# Patient Record
Sex: MALE | Race: WHITE | NOT HISPANIC OR LATINO | Employment: OTHER | URBAN - NONMETROPOLITAN AREA
[De-identification: names, ages, dates, MRNs, and addresses within clinical notes are randomized per-mention and may not be internally consistent; named-entity substitution may affect disease eponyms.]

---

## 2021-11-10 ENCOUNTER — HOSPITAL ENCOUNTER (EMERGENCY)
Facility: HOSPITAL | Age: 71
Discharge: HOME | End: 2021-11-10
Attending: EMERGENCY MEDICINE | Admitting: EMERGENCY MEDICINE
Payer: MEDICARE

## 2021-11-10 ENCOUNTER — APPOINTMENT (EMERGENCY)
Dept: CT IMAGING | Facility: HOSPITAL | Age: 71
End: 2021-11-10
Payer: MEDICARE

## 2021-11-10 VITALS
OXYGEN SATURATION: 97 % | SYSTOLIC BLOOD PRESSURE: 154 MMHG | RESPIRATION RATE: 18 BRPM | DIASTOLIC BLOOD PRESSURE: 94 MMHG | HEART RATE: 80 BPM | TEMPERATURE: 98.5 F

## 2021-11-10 DIAGNOSIS — N20.1 LEFT URETERAL STONE: Primary | ICD-10-CM

## 2021-11-10 LAB
ALBUMIN SERPL BCP-MCNC: 4.1 G/DL (ref 3.5–5)
ALP SERPL-CCNC: 81 U/L (ref 46–116)
ALT SERPL W P-5'-P-CCNC: 46 U/L (ref 12–78)
ANION GAP SERPL CALCULATED.3IONS-SCNC: 10 MMOL/L (ref 4–13)
AST SERPL W P-5'-P-CCNC: 26 U/L (ref 5–45)
BACTERIA UR QL AUTO: ABNORMAL /HPF
BILIRUB SERPL-MCNC: 1.34 MG/DL (ref 0.2–1)
BILIRUB UR QL STRIP: ABNORMAL
BUN SERPL-MCNC: 27 MG/DL (ref 5–25)
CALCIUM SERPL-MCNC: 9.4 MG/DL (ref 8.3–10.1)
CAOX CRY URNS QL MICRO: ABNORMAL /HPF
CHLORIDE SERPL-SCNC: 103 MMOL/L (ref 100–108)
CLARITY UR: ABNORMAL
CO2 SERPL-SCNC: 27 MMOL/L (ref 21–32)
COLOR UR: YELLOW
CREAT SERPL-MCNC: 1.05 MG/DL (ref 0.6–1.3)
ERYTHROCYTE [DISTWIDTH] IN BLOOD BY AUTOMATED COUNT: 12 % (ref 11.6–15.1)
GFR SERPL CREATININE-BSD FRML MDRD: 72 ML/MIN/1.73SQ M
GLUCOSE SERPL-MCNC: 154 MG/DL (ref 65–140)
GLUCOSE UR STRIP-MCNC: NEGATIVE MG/DL
HCT VFR BLD AUTO: 42.6 % (ref 36.5–49.3)
HGB BLD-MCNC: 15.2 G/DL (ref 12–17)
HGB UR QL STRIP.AUTO: ABNORMAL
HYALINE CASTS #/AREA URNS LPF: ABNORMAL /LPF
KETONES UR STRIP-MCNC: ABNORMAL MG/DL
LACTATE SERPL-SCNC: 1.1 MMOL/L (ref 0.5–2)
LEUKOCYTE ESTERASE UR QL STRIP: NEGATIVE
LIPASE SERPL-CCNC: 147 U/L (ref 73–393)
MCH RBC QN AUTO: 33.3 PG (ref 26.8–34.3)
MCHC RBC AUTO-ENTMCNC: 35.7 G/DL (ref 31.4–37.4)
MCV RBC AUTO: 93 FL (ref 82–98)
NITRITE UR QL STRIP: NEGATIVE
NON-SQ EPI CELLS URNS QL MICRO: ABNORMAL /HPF
PH UR STRIP.AUTO: 5.5 [PH]
PLATELET # BLD AUTO: 188 THOUSANDS/UL (ref 149–390)
PMV BLD AUTO: 10.5 FL (ref 8.9–12.7)
POTASSIUM SERPL-SCNC: 3.8 MMOL/L (ref 3.5–5.3)
PROT SERPL-MCNC: 7.3 G/DL (ref 6.4–8.2)
PROT UR STRIP-MCNC: NEGATIVE MG/DL
RBC # BLD AUTO: 4.56 MILLION/UL (ref 3.88–5.62)
RBC #/AREA URNS AUTO: ABNORMAL /HPF
SODIUM SERPL-SCNC: 140 MMOL/L (ref 136–145)
SP GR UR STRIP.AUTO: 1.02 (ref 1–1.03)
UROBILINOGEN UR QL STRIP.AUTO: 0.2 E.U./DL
WBC # BLD AUTO: 13.17 THOUSAND/UL (ref 4.31–10.16)
WBC #/AREA URNS AUTO: ABNORMAL /HPF

## 2021-11-10 PROCEDURE — 99285 EMERGENCY DEPT VISIT HI MDM: CPT | Performed by: EMERGENCY MEDICINE

## 2021-11-10 PROCEDURE — 83690 ASSAY OF LIPASE: CPT | Performed by: EMERGENCY MEDICINE

## 2021-11-10 PROCEDURE — 99284 EMERGENCY DEPT VISIT MOD MDM: CPT

## 2021-11-10 PROCEDURE — 93005 ELECTROCARDIOGRAM TRACING: CPT

## 2021-11-10 PROCEDURE — 80053 COMPREHEN METABOLIC PANEL: CPT | Performed by: EMERGENCY MEDICINE

## 2021-11-10 PROCEDURE — 74176 CT ABD & PELVIS W/O CONTRAST: CPT

## 2021-11-10 PROCEDURE — 83605 ASSAY OF LACTIC ACID: CPT | Performed by: EMERGENCY MEDICINE

## 2021-11-10 PROCEDURE — 96375 TX/PRO/DX INJ NEW DRUG ADDON: CPT

## 2021-11-10 PROCEDURE — 96361 HYDRATE IV INFUSION ADD-ON: CPT

## 2021-11-10 PROCEDURE — 85025 COMPLETE CBC W/AUTO DIFF WBC: CPT | Performed by: EMERGENCY MEDICINE

## 2021-11-10 PROCEDURE — 96374 THER/PROPH/DIAG INJ IV PUSH: CPT

## 2021-11-10 PROCEDURE — 81001 URINALYSIS AUTO W/SCOPE: CPT | Performed by: EMERGENCY MEDICINE

## 2021-11-10 PROCEDURE — 36415 COLL VENOUS BLD VENIPUNCTURE: CPT | Performed by: EMERGENCY MEDICINE

## 2021-11-10 RX ORDER — NAPROXEN 500 MG/1
500 TABLET ORAL 2 TIMES DAILY WITH MEALS
Qty: 30 TABLET | Refills: 0 | Status: SHIPPED | OUTPATIENT
Start: 2021-11-10

## 2021-11-10 RX ORDER — TAMSULOSIN HYDROCHLORIDE 0.4 MG/1
0.4 CAPSULE ORAL
Qty: 10 CAPSULE | Refills: 0 | Status: SHIPPED | OUTPATIENT
Start: 2021-11-10 | End: 2021-11-15 | Stop reason: SDUPTHER

## 2021-11-10 RX ORDER — KETOROLAC TROMETHAMINE 30 MG/ML
15 INJECTION, SOLUTION INTRAMUSCULAR; INTRAVENOUS ONCE
Status: COMPLETED | OUTPATIENT
Start: 2021-11-10 | End: 2021-11-10

## 2021-11-10 RX ORDER — ONDANSETRON 2 MG/ML
4 INJECTION INTRAMUSCULAR; INTRAVENOUS ONCE
Status: COMPLETED | OUTPATIENT
Start: 2021-11-10 | End: 2021-11-10

## 2021-11-10 RX ORDER — ONDANSETRON 4 MG/1
4 TABLET, FILM COATED ORAL EVERY 6 HOURS
Qty: 12 TABLET | Refills: 0 | Status: SHIPPED | OUTPATIENT
Start: 2021-11-10

## 2021-11-10 RX ORDER — OXYCODONE HYDROCHLORIDE AND ACETAMINOPHEN 5; 325 MG/1; MG/1
1 TABLET ORAL EVERY 4 HOURS PRN
Qty: 10 TABLET | Refills: 0 | Status: SHIPPED | OUTPATIENT
Start: 2021-11-10

## 2021-11-10 RX ORDER — TAMSULOSIN HYDROCHLORIDE 0.4 MG/1
0.4 CAPSULE ORAL ONCE
Status: COMPLETED | OUTPATIENT
Start: 2021-11-10 | End: 2021-11-10

## 2021-11-10 RX ADMIN — TAMSULOSIN HYDROCHLORIDE 0.4 MG: 0.4 CAPSULE ORAL at 22:30

## 2021-11-10 RX ADMIN — ONDANSETRON 4 MG: 2 INJECTION INTRAMUSCULAR; INTRAVENOUS at 21:03

## 2021-11-10 RX ADMIN — SODIUM CHLORIDE 1000 ML: 0.9 INJECTION, SOLUTION INTRAVENOUS at 20:59

## 2021-11-10 RX ADMIN — KETOROLAC TROMETHAMINE 15 MG: 30 INJECTION, SOLUTION INTRAMUSCULAR at 21:00

## 2021-11-11 ENCOUNTER — TELEPHONE (OUTPATIENT)
Dept: UROLOGY | Facility: MEDICAL CENTER | Age: 71
End: 2021-11-11

## 2021-11-15 ENCOUNTER — OFFICE VISIT (OUTPATIENT)
Dept: UROLOGY | Facility: CLINIC | Age: 71
End: 2021-11-15
Payer: MEDICARE

## 2021-11-15 VITALS — DIASTOLIC BLOOD PRESSURE: 82 MMHG | WEIGHT: 185.6 LBS | SYSTOLIC BLOOD PRESSURE: 142 MMHG | HEART RATE: 70 BPM

## 2021-11-15 DIAGNOSIS — N20.0 NEPHROLITHIASIS: Primary | ICD-10-CM

## 2021-11-15 DIAGNOSIS — N20.1 LEFT URETERAL STONE: ICD-10-CM

## 2021-11-15 LAB
ATRIAL RATE: 59 BPM
P AXIS: 57 DEGREES
POST-VOID RESIDUAL VOLUME, ML POC: 53 ML
PR INTERVAL: 180 MS
QRS AXIS: 55 DEGREES
QRSD INTERVAL: 102 MS
QT INTERVAL: 384 MS
QTC INTERVAL: 380 MS
SL AMB  POCT GLUCOSE, UA: NORMAL
SL AMB LEUKOCYTE ESTERASE,UA: NORMAL
SL AMB POCT BILIRUBIN,UA: NORMAL
SL AMB POCT BLOOD,UA: NORMAL
SL AMB POCT CLARITY,UA: CLEAR
SL AMB POCT COLOR,UA: YELLOW
SL AMB POCT KETONES,UA: NORMAL
SL AMB POCT NITRITE,UA: NORMAL
SL AMB POCT PH,UA: 5
SL AMB POCT SPECIFIC GRAVITY,UA: 1.02
SL AMB POCT URINE PROTEIN: NORMAL
SL AMB POCT UROBILINOGEN: 0.2
T WAVE AXIS: 45 DEGREES
VENTRICULAR RATE: 59 BPM

## 2021-11-15 PROCEDURE — 93010 ELECTROCARDIOGRAM REPORT: CPT | Performed by: INTERNAL MEDICINE

## 2021-11-15 PROCEDURE — 51798 US URINE CAPACITY MEASURE: CPT | Performed by: NURSE PRACTITIONER

## 2021-11-15 PROCEDURE — 81002 URINALYSIS NONAUTO W/O SCOPE: CPT | Performed by: NURSE PRACTITIONER

## 2021-11-15 PROCEDURE — 99202 OFFICE O/P NEW SF 15 MIN: CPT | Performed by: NURSE PRACTITIONER

## 2021-11-15 RX ORDER — TADALAFIL 20 MG/1
20 TABLET ORAL AS NEEDED
COMMUNITY
Start: 2021-09-21

## 2021-11-15 RX ORDER — OMEGA-3 FATTY ACIDS CAP DELAYED RELEASE 1000 MG 1000 MG
CAPSULE DELAYED RELEASE ORAL
COMMUNITY

## 2021-11-15 RX ORDER — TAMSULOSIN HYDROCHLORIDE 0.4 MG/1
0.4 CAPSULE ORAL
Qty: 30 CAPSULE | Refills: 0 | Status: SHIPPED | OUTPATIENT
Start: 2021-11-15 | End: 2021-11-25

## 2021-11-19 ENCOUNTER — TELEPHONE (OUTPATIENT)
Dept: OTHER | Facility: OTHER | Age: 71
End: 2021-11-19

## 2021-11-19 DIAGNOSIS — N20.1 LEFT URETERAL STONE: Primary | ICD-10-CM

## 2022-07-16 ENCOUNTER — HOSPITAL ENCOUNTER (OUTPATIENT)
Dept: ULTRASOUND IMAGING | Facility: HOSPITAL | Age: 72
Discharge: HOME | End: 2022-07-16
Payer: MEDICARE

## 2022-07-16 DIAGNOSIS — R94.5 NONSPECIFIC ABNORMAL RESULTS OF LIVER FUNCTION STUDY: ICD-10-CM

## 2022-07-16 PROCEDURE — 76700 US EXAM ABDOM COMPLETE: CPT

## 2022-12-12 ENCOUNTER — OFFICE VISIT (OUTPATIENT)
Dept: UROLOGY | Facility: CLINIC | Age: 72
End: 2022-12-12

## 2022-12-12 VITALS
TEMPERATURE: 98.4 F | WEIGHT: 178 LBS | OXYGEN SATURATION: 96 % | HEIGHT: 72 IN | HEART RATE: 70 BPM | BODY MASS INDEX: 24.11 KG/M2 | SYSTOLIC BLOOD PRESSURE: 144 MMHG | DIASTOLIC BLOOD PRESSURE: 82 MMHG

## 2022-12-12 DIAGNOSIS — N40.1 BENIGN PROSTATIC HYPERPLASIA WITH NOCTURIA: ICD-10-CM

## 2022-12-12 DIAGNOSIS — Z12.5 PROSTATE CANCER SCREENING: Primary | ICD-10-CM

## 2022-12-12 DIAGNOSIS — R35.0 URINARY FREQUENCY: ICD-10-CM

## 2022-12-12 DIAGNOSIS — R35.1 BENIGN PROSTATIC HYPERPLASIA WITH NOCTURIA: ICD-10-CM

## 2022-12-12 LAB
SL AMB  POCT GLUCOSE, UA: NORMAL
SL AMB LEUKOCYTE ESTERASE,UA: NORMAL
SL AMB POCT BILIRUBIN,UA: NORMAL
SL AMB POCT BLOOD,UA: NORMAL
SL AMB POCT CLARITY,UA: CLEAR
SL AMB POCT COLOR,UA: YELLOW
SL AMB POCT KETONES,UA: NORMAL
SL AMB POCT NITRITE,UA: NORMAL
SL AMB POCT PH,UA: 7
SL AMB POCT SPECIFIC GRAVITY,UA: 1.02
SL AMB POCT URINE PROTEIN: NORMAL
SL AMB POCT UROBILINOGEN: 0.2

## 2022-12-12 RX ORDER — TAMSULOSIN HYDROCHLORIDE 0.4 MG/1
0.4 CAPSULE ORAL
Qty: 30 CAPSULE | Refills: 3 | Status: SHIPPED | OUTPATIENT
Start: 2022-12-12 | End: 2022-12-22

## 2022-12-12 NOTE — PROGRESS NOTES
12/12/2022    No chief complaint on file  Assessment and Plan    81 y o  male     1  BPH with LUTS  · Symptoms of urinary frequency, nocturia, sensation of incomplete bladder emptying, and difficulty starting urination  · Recommend trial of tamsulosin 0 4 mg HS with follow-up in 3 months    2  Prostate Cancer Screening  · Negative family history of prostate cancer  · RUDOLPH deferred to next office visit  · No prior PSA on record  · PSA ordered      History of Present Illness  Jelani Wallace is a 81 y o  male here for evaluation of urinary frequency  He was initially seen by Charito Chawla on 11/15/2021 after presenting to the emergency room and being diagnosed with a 5 x 3 x 3 mm calculus at the left UVJ with mild left hydroureteronephrosis  Patient at that time was recommended for continued medical expulsion therapy with repeat imaging in 4 weeks  Patient has not been seen since initial office visit and renal ultrasound and x-ray KUB were not completed  He present today with his wife complaining of urinary frequency that has been ongoing for several years  He was prescribed Flomax in the past, but this for his history of kidney stones  He reports urinary frequency every hour during the night and about every 2 hours during the day time  He also complains of difficulty starting urination and sensation of incomplete bladder emptying  He primarily drinks coffee 2 cups in the morning, 2 cups of fruit juice or orange juice, and 2 cups of water per day  Denies alcohol use  Prostate cancer Screening: negative family history of prostate cancer  No PSA on record to review  Review of Systems   Constitutional: Negative for chills and fever  HENT: Negative for congestion and sore throat  Respiratory: Negative for cough and shortness of breath  Cardiovascular: Negative for chest pain and leg swelling  Gastrointestinal: Negative for abdominal pain, constipation, diarrhea, nausea and vomiting  Genitourinary: Positive for difficulty urinating and frequency  Negative for dysuria, hematuria and urgency  Nocturia     Musculoskeletal: Negative for back pain and gait problem  Skin: Negative for wound  Allergic/Immunologic: Negative for immunocompromised state  Neurological: Negative for dizziness, weakness and numbness  Hematological: Does not bruise/bleed easily  Vitals  Vitals:    12/12/22 1540   BP: 144/82   BP Location: Left arm   Patient Position: Sitting   Cuff Size: Standard   Pulse: 70   Temp: 98 4 °F (36 9 °C)   SpO2: 96%   Weight: 80 7 kg (178 lb)   Height: 6' (1 829 m)       Physical Exam  Vitals reviewed  Constitutional:       General: He is not in acute distress  Appearance: Normal appearance  He is not ill-appearing or toxic-appearing  HENT:      Head: Normocephalic and atraumatic  Eyes:      General: No scleral icterus  Conjunctiva/sclera: Conjunctivae normal    Cardiovascular:      Rate and Rhythm: Normal rate  Pulmonary:      Effort: Pulmonary effort is normal  No respiratory distress  Abdominal:      Tenderness: There is no right CVA tenderness or left CVA tenderness  Hernia: No hernia is present  Musculoskeletal:      Cervical back: Normal range of motion  Right lower leg: No edema  Left lower leg: No edema  Skin:     General: Skin is warm and dry  Coloration: Skin is not jaundiced or pale  Neurological:      General: No focal deficit present  Mental Status: He is alert and oriented to person, place, and time  Mental status is at baseline  Gait: Gait normal    Psychiatric:         Mood and Affect: Mood normal          Behavior: Behavior normal          Thought Content: Thought content normal          Judgment: Judgment normal          Past History  History reviewed  No pertinent past medical history    Social History     Socioeconomic History   • Marital status: /Civil Union     Spouse name: None   • Number of children: None   • Years of education: None   • Highest education level: None   Occupational History   • None   Tobacco Use   • Smoking status: Never   • Smokeless tobacco: Never   Vaping Use   • Vaping Use: Never used   Substance and Sexual Activity   • Alcohol use: Yes   • Drug use: Never   • Sexual activity: Never   Other Topics Concern   • None   Social History Narrative   • None     Social Determinants of Health     Financial Resource Strain: Not on file   Food Insecurity: Not on file   Transportation Needs: Not on file   Physical Activity: Not on file   Stress: Not on file   Social Connections: Not on file   Intimate Partner Violence: Not on file   Housing Stability: Not on file     Social History     Tobacco Use   Smoking Status Never   Smokeless Tobacco Never     History reviewed  No pertinent family history  The following portions of the patient's history were reviewed and updated as appropriate allergies, current medications, past medical history, past social history, past surgical history and problem list    Imaging:    Results  Recent Results (from the past 1 hour(s))   POCT urine dip    Collection Time: 12/12/22  3:47 PM   Result Value Ref Range    LEUKOCYTE ESTERASE,UA neg     NITRITE,UA neg     SL AMB POCT UROBILINOGEN 0 2     POCT URINE PROTEIN neg      PH,UA 7 0     BLOOD,UA neg     SPECIFIC GRAVITY,UA 1 020     KETONES,UA neg     BILIRUBIN,UA neg     GLUCOSE, UA neg      COLOR,UA yellow     CLARITY,UA clear    ]  No results found for: PSA  Lab Results   Component Value Date    CALCIUM 9 4 11/10/2021    K 3 8 11/10/2021    CO2 27 11/10/2021     11/10/2021    BUN 27 (H) 11/10/2021    CREATININE 1 05 11/10/2021     Lab Results   Component Value Date    WBC 13 17 (H) 11/10/2021    HGB 15 2 11/10/2021    HCT 42 6 11/10/2021    MCV 93 11/10/2021     11/10/2021       Please Note:  Voice dictation software has been used to create this document   There may be inadvertent transcriptions errors       Hortencia Tolbert

## 2022-12-13 ENCOUNTER — APPOINTMENT (OUTPATIENT)
Dept: LAB | Facility: HOSPITAL | Age: 72
End: 2022-12-13

## 2022-12-13 DIAGNOSIS — Z12.5 PROSTATE CANCER SCREENING: ICD-10-CM

## 2022-12-13 LAB — PSA SERPL-MCNC: 0.4 NG/ML (ref 0–4)

## 2022-12-14 DIAGNOSIS — Z12.5 PROSTATE CANCER SCREENING: Primary | ICD-10-CM

## 2023-01-19 ENCOUNTER — NURSE TRIAGE (OUTPATIENT)
Dept: OTHER | Facility: OTHER | Age: 73
End: 2023-01-19

## 2023-01-19 DIAGNOSIS — R10.9 FLANK PAIN: Primary | ICD-10-CM

## 2023-01-19 NOTE — TELEPHONE ENCOUNTER
Patient called in to report mild left flank and back pain; similar to past kidney stone pain  Pain is constant; yet comes and goes  Denies any other symptoms  Advised to increase hydration  Triage RN made patient aware of renal ultrasound and x-ray KUB that were ordered 11/2021 and would possibly need to be scheduled and done to rule out kidney stones  Please follow up with patient for testing; if the current orders are still active; and current care advice  Reason for Disposition  • MILD pain (i e , scale 1-3; does not interfere with normal activities) and present > 3 days    Answer Assessment - Initial Assessment Questions  1  LOCATION: "Where does it hurt?" (e g , left, right)      Left side and back  2  ONSET: "When did the pain start?"      Past one week  3  SEVERITY: "How bad is the pain?" (e g , Scale 1-10; mild, moderate, or severe)    - MILD (1-3): doesn't interfere with normal activities     - MODERATE (4-7): interferes with normal activities or awakens from sleep     - SEVERE (8-10): excruciating pain and patient unable to do normal activities (stays in bed)        Mild  4  PATTERN: "Does the pain come and go, or is it constant?"       Comes and goes, but more constant  5  CAUSE: "What do you think is causing the pain?"      Possibly another kidney stone  6  OTHER SYMPTOMS:  "Do you have any other symptoms?" (e g , fever, abdominal pain, vomiting, leg weakness, burning with urination, blood in urine)      Denies    Protocols used:  FLANK PAIN-ADULT-OH

## 2023-01-19 NOTE — TELEPHONE ENCOUNTER
Orders placed for stat CT renal stone study as well as BMP, CBC, and urine testing  Given mild pain recommend fluid hydration, Flomax, NSAIDS or tylenol for pain as well as heating pad

## 2023-01-19 NOTE — TELEPHONE ENCOUNTER
Regarding: kidney stone concern/left side back pain  ----- Message from [facility] sent at 1/19/2023  9:39 AM EST -----  " I was seen in the office 12/12 I was prescribe flow max   I have kidney stones before and Tanvi had a constant pain in my back lefthand side and im concerned because this is how my stones started last time "

## 2023-01-23 NOTE — TELEPHONE ENCOUNTER
3rd attempt to reach patient, line busy  Left message on [employer] machine to have patient contact office  Letter sent to patient as well

## 2023-03-13 ENCOUNTER — LAB (OUTPATIENT)
Dept: LAB | Facility: HOSPITAL | Age: 73
End: 2023-03-13

## 2023-03-13 ENCOUNTER — OFFICE VISIT (OUTPATIENT)
Dept: UROLOGY | Facility: CLINIC | Age: 73
End: 2023-03-13

## 2023-03-13 VITALS — BODY MASS INDEX: 24.38 KG/M2 | HEIGHT: 72 IN | OXYGEN SATURATION: 98 % | HEART RATE: 62 BPM | WEIGHT: 180 LBS

## 2023-03-13 DIAGNOSIS — R73.09 ELEVATED GLUCOSE: ICD-10-CM

## 2023-03-13 DIAGNOSIS — R10.9 FLANK PAIN: ICD-10-CM

## 2023-03-13 DIAGNOSIS — R35.0 URINARY FREQUENCY: ICD-10-CM

## 2023-03-13 DIAGNOSIS — Z13.1 SCREENING FOR DIABETES MELLITUS (DM): ICD-10-CM

## 2023-03-13 DIAGNOSIS — R35.1 BENIGN PROSTATIC HYPERPLASIA WITH NOCTURIA: Primary | ICD-10-CM

## 2023-03-13 DIAGNOSIS — N40.1 BENIGN PROSTATIC HYPERPLASIA WITH NOCTURIA: Primary | ICD-10-CM

## 2023-03-13 DIAGNOSIS — Z12.5 PROSTATE CANCER SCREENING: ICD-10-CM

## 2023-03-13 LAB
ANION GAP SERPL CALCULATED.3IONS-SCNC: 5 MMOL/L (ref 4–13)
BACTERIA UR QL AUTO: NORMAL /HPF
BASOPHILS # BLD AUTO: 0.05 THOUSANDS/ÂΜL (ref 0–0.1)
BASOPHILS NFR BLD AUTO: 1 % (ref 0–1)
BILIRUB UR QL STRIP: NEGATIVE
BUN SERPL-MCNC: 16 MG/DL (ref 5–25)
CALCIUM SERPL-MCNC: 9.6 MG/DL (ref 8.4–10.2)
CHLORIDE SERPL-SCNC: 104 MMOL/L (ref 96–108)
CLARITY UR: CLEAR
CO2 SERPL-SCNC: 30 MMOL/L (ref 21–32)
COLOR UR: YELLOW
CREAT SERPL-MCNC: 0.81 MG/DL (ref 0.6–1.3)
EOSINOPHIL # BLD AUTO: 0.09 THOUSAND/ÂΜL (ref 0–0.61)
EOSINOPHIL NFR BLD AUTO: 2 % (ref 0–6)
ERYTHROCYTE [DISTWIDTH] IN BLOOD BY AUTOMATED COUNT: 12.5 % (ref 11.6–15.1)
EST. AVERAGE GLUCOSE BLD GHB EST-MCNC: 108 MG/DL
GFR SERPL CREATININE-BSD FRML MDRD: 90 ML/MIN/1.73SQ M
GLUCOSE P FAST SERPL-MCNC: 99 MG/DL (ref 65–99)
GLUCOSE UR STRIP-MCNC: NEGATIVE MG/DL
HBA1C MFR BLD: 5.4 %
HCT VFR BLD AUTO: 44.8 % (ref 36.5–49.3)
HGB BLD-MCNC: 15.4 G/DL (ref 12–17)
HGB UR QL STRIP.AUTO: NEGATIVE
IMM GRANULOCYTES # BLD AUTO: 0.01 THOUSAND/UL (ref 0–0.2)
IMM GRANULOCYTES NFR BLD AUTO: 0 % (ref 0–2)
KETONES UR STRIP-MCNC: NEGATIVE MG/DL
LEUKOCYTE ESTERASE UR QL STRIP: NEGATIVE
LYMPHOCYTES # BLD AUTO: 1.29 THOUSANDS/ÂΜL (ref 0.6–4.47)
LYMPHOCYTES NFR BLD AUTO: 28 % (ref 14–44)
MCH RBC QN AUTO: 33.4 PG (ref 26.8–34.3)
MCHC RBC AUTO-ENTMCNC: 34.4 G/DL (ref 31.4–37.4)
MCV RBC AUTO: 97 FL (ref 82–98)
MONOCYTES # BLD AUTO: 0.46 THOUSAND/ÂΜL (ref 0.17–1.22)
MONOCYTES NFR BLD AUTO: 10 % (ref 4–12)
NEUTROPHILS # BLD AUTO: 2.66 THOUSANDS/ÂΜL (ref 1.85–7.62)
NEUTS SEG NFR BLD AUTO: 59 % (ref 43–75)
NITRITE UR QL STRIP: NEGATIVE
NON-SQ EPI CELLS URNS QL MICRO: NORMAL /HPF
NRBC BLD AUTO-RTO: 0 /100 WBCS
PH UR STRIP.AUTO: 6.5 [PH]
PLATELET # BLD AUTO: 169 THOUSANDS/UL (ref 149–390)
PMV BLD AUTO: 10 FL (ref 8.9–12.7)
POTASSIUM SERPL-SCNC: 4.5 MMOL/L (ref 3.5–5.3)
PROT UR STRIP-MCNC: NEGATIVE MG/DL
PSA SERPL-MCNC: 0.4 NG/ML (ref 0–4)
RBC # BLD AUTO: 4.61 MILLION/UL (ref 3.88–5.62)
RBC #/AREA URNS AUTO: NORMAL /HPF
SODIUM SERPL-SCNC: 139 MMOL/L (ref 135–147)
SP GR UR STRIP.AUTO: 1.01 (ref 1–1.03)
UROBILINOGEN UR QL STRIP.AUTO: 0.2 E.U./DL
WBC # BLD AUTO: 4.56 THOUSAND/UL (ref 4.31–10.16)
WBC #/AREA URNS AUTO: NORMAL /HPF

## 2023-03-13 NOTE — PROGRESS NOTES
3/13/2023    No chief complaint on file  Assessment and Plan    81 y o  male     1  BPH with LUTS  · Continues to complain of urinary frequency, incomplete bladder emptying and nocturia upwards of 5 times per night despite trial of tamsulosin 0 4 mg HS  · PVR today 26 mL  · We discuss options of trying an alternative alpha-blocker versus addition of finasteride versus scheduling him for a cystoscopy  He has elected to continue with Flomax and we will schedule him for a cystoscopy to further evaluate and discuss possible options of Urolift versus TURP  We will also check a hemoglobin A1C as he has had elevated glucose levels in the past with BMP testing  Subjective:    He presents today reporting no change in his urinary frequency and continues to report sensation of incomplete bladder emptying since continued use of Flomax  In the past he was prescribed Flomax for history of kidney stones, I recommended he try Flomax 0 4 mg HS and he reports no change with use  PVR today is 26 mL  History of Present Illness  Vishnu Costello is a 81 y o  male here for follow-up evaluation of BPH with LUTS and prostate cancer screening  He was last seen by me on 12/12/2022 with complaints of urinary frequency that has been ongoing for several years  He was prescribed Flomax in the past, but this for his history of kidney stones  He reports urinary frequency every hour during the night and about every 2 hours during the day time  He also complains of difficulty starting urination and sensation of incomplete bladder emptying  He primarily drinks coffee 2 cups in the morning, 2 cups of fruit juice or orange juice, and 2 cups of water per day  Denies alcohol use          Prostate cancer Screening: negative family history of prostate cancer  PSA 0 4 as of 12/13/2022  Has a history of kidney stones in the past was seen by Susan Elizondo in November 2021 45 mm left UVJ calculi    Patient was recommended continued medical therapy with repeat imaging in 4 weeks however never followed up  On 1/19/2023 patient did contact the office complaining of flank pain and stat CT imaging was ordered  This was not completed and he reports the pain subsided several days later  He denied any gross hematuria or passage of a stone at that time  Review of Systems   Constitutional: Negative for chills and fever  HENT: Negative for congestion and sore throat  Respiratory: Negative for cough and shortness of breath  Cardiovascular: Negative for chest pain and leg swelling  Gastrointestinal: Negative for abdominal pain, constipation, diarrhea, nausea and vomiting  Genitourinary: Positive for frequency  Negative for difficulty urinating, dysuria, flank pain, hematuria and urgency  Incomplete bladder emptying, nocturia    Musculoskeletal: Negative for back pain and gait problem  Skin: Negative for wound  Allergic/Immunologic: Negative for immunocompromised state  Neurological: Negative for dizziness, weakness and numbness  Hematological: Does not bruise/bleed easily  Vitals  Vitals:    03/13/23 0939   BP: (P) 134/76   BP Location: (P) Left arm   Patient Position: (P) Sitting   Cuff Size: (P) Standard   Pulse: 62   SpO2: 98%   Weight: 81 6 kg (180 lb)   Height: 6' (1 829 m)       Physical Exam  Vitals reviewed  Constitutional:       General: He is not in acute distress  Appearance: Normal appearance  He is not ill-appearing or toxic-appearing  HENT:      Head: Normocephalic and atraumatic  Eyes:      General: No scleral icterus  Conjunctiva/sclera: Conjunctivae normal    Cardiovascular:      Rate and Rhythm: Normal rate  Pulses: Normal pulses  Pulmonary:      Effort: Pulmonary effort is normal  No respiratory distress  Abdominal:      Palpations: Abdomen is soft  Tenderness: There is no abdominal tenderness   There is no right CVA tenderness or left CVA tenderness  Hernia: No hernia is present  Musculoskeletal:      Cervical back: Normal range of motion  Right lower leg: No edema  Left lower leg: No edema  Skin:     General: Skin is warm and dry  Coloration: Skin is not jaundiced or pale  Neurological:      General: No focal deficit present  Mental Status: He is alert and oriented to person, place, and time  Mental status is at baseline  Gait: Gait normal    Psychiatric:         Mood and Affect: Mood normal          Behavior: Behavior normal          Thought Content: Thought content normal          Judgment: Judgment normal          Past History  History reviewed  No pertinent past medical history  Social History     Socioeconomic History   • Marital status: /Civil Union     Spouse name: None   • Number of children: None   • Years of education: None   • Highest education level: None   Occupational History   • None   Tobacco Use   • Smoking status: Never   • Smokeless tobacco: Never   Vaping Use   • Vaping Use: Never used   Substance and Sexual Activity   • Alcohol use: Yes   • Drug use: Never   • Sexual activity: Never   Other Topics Concern   • None   Social History Narrative   • None     Social Determinants of Health     Financial Resource Strain: Not on file   Food Insecurity: Not on file   Transportation Needs: Not on file   Physical Activity: Not on file   Stress: Not on file   Social Connections: Not on file   Intimate Partner Violence: Not on file   Housing Stability: Not on file     Social History     Tobacco Use   Smoking Status Never   Smokeless Tobacco Never     History reviewed  No pertinent family history      The following portions of the patient's history were reviewed and updated as appropriate allergies, current medications, past medical history, past social history, past surgical history and problem list    Imaging:    Results  No results found for this or any previous visit (from the past 1 hour(s)) ]  Lab Results   Component Value Date    PSA 0 4 12/13/2022     Lab Results   Component Value Date    CALCIUM 9 4 11/10/2021    K 3 8 11/10/2021    CO2 27 11/10/2021     11/10/2021    BUN 27 (H) 11/10/2021    CREATININE 1 05 11/10/2021     Lab Results   Component Value Date    WBC 13 17 (H) 11/10/2021    HGB 15 2 11/10/2021    HCT 42 6 11/10/2021    MCV 93 11/10/2021     11/10/2021       Please Note:  Voice dictation software has been used to create this document  There may be inadvertent transcriptions errors       ELIZABETH Bingham 03/13/23

## 2023-03-14 LAB — BACTERIA UR CULT: NORMAL

## 2023-04-05 ENCOUNTER — TELEPHONE (OUTPATIENT)
Dept: UROLOGY | Facility: CLINIC | Age: 73
End: 2023-04-05

## 2023-04-05 NOTE — TELEPHONE ENCOUNTER
Left message for pt to call back to reschedule appt for this Friday, 4/7  No uroflow at this time   Please reschedule for week of 4/19

## 2023-04-19 PROBLEM — R35.1 BENIGN PROSTATIC HYPERPLASIA WITH NOCTURIA: Status: ACTIVE | Noted: 2023-04-19

## 2023-04-19 PROBLEM — N40.1 BENIGN PROSTATIC HYPERPLASIA WITH NOCTURIA: Status: ACTIVE | Noted: 2023-04-19

## 2023-04-19 PROBLEM — R35.0 URINARY FREQUENCY: Status: ACTIVE | Noted: 2023-04-19

## 2023-05-01 ENCOUNTER — TELEPHONE (OUTPATIENT)
Dept: UROLOGY | Facility: CLINIC | Age: 73
End: 2023-05-01

## 2023-05-01 ENCOUNTER — PREP FOR PROCEDURE (OUTPATIENT)
Dept: UROLOGY | Facility: CLINIC | Age: 73
End: 2023-05-01

## 2023-05-01 DIAGNOSIS — R35.0 URINARY FREQUENCY: Primary | ICD-10-CM

## 2023-05-01 DIAGNOSIS — Z01.818 ENCOUNTER FOR PREADMISSION TESTING: ICD-10-CM

## 2023-05-01 DIAGNOSIS — R39.89 SUSPECTED UTI: ICD-10-CM

## 2023-05-01 NOTE — TELEPHONE ENCOUNTER
Spoke to pt, scheduled urolift procedure for 6/5 with KAREN'Amico  Verbal instructions given and mailed

## 2023-05-05 ENCOUNTER — TELEPHONE (OUTPATIENT)
Dept: OTHER | Facility: OTHER | Age: 73
End: 2023-05-05

## 2023-05-05 NOTE — TELEPHONE ENCOUNTER
Patient is requesting a call back from the office regarding his upcoming procedure and wants to make sure his insurance approved it

## 2023-05-05 NOTE — TELEPHONE ENCOUNTER
Pt  Made aware that his instructions for upcoming urolift was mailed to his home  Verbalized understanding

## 2023-05-11 ENCOUNTER — TELEPHONE (OUTPATIENT)
Dept: OTHER | Facility: OTHER | Age: 73
End: 2023-05-11

## 2023-05-24 ENCOUNTER — APPOINTMENT (OUTPATIENT)
Dept: LAB | Facility: HOSPITAL | Age: 73
End: 2023-05-24
Attending: UROLOGY

## 2023-05-24 ENCOUNTER — OFFICE VISIT (OUTPATIENT)
Dept: LAB | Facility: HOSPITAL | Age: 73
End: 2023-05-24

## 2023-05-24 DIAGNOSIS — R35.0 URINARY FREQUENCY: ICD-10-CM

## 2023-05-24 DIAGNOSIS — R39.89 SUSPECTED UTI: ICD-10-CM

## 2023-05-24 DIAGNOSIS — Z01.818 ENCOUNTER FOR PREADMISSION TESTING: ICD-10-CM

## 2023-05-24 LAB
ANION GAP SERPL CALCULATED.3IONS-SCNC: 6 MMOL/L (ref 4–13)
BASOPHILS # BLD AUTO: 0.05 THOUSANDS/ÂΜL (ref 0–0.1)
BASOPHILS NFR BLD AUTO: 1 % (ref 0–1)
BUN SERPL-MCNC: 16 MG/DL (ref 5–25)
CALCIUM SERPL-MCNC: 9.4 MG/DL (ref 8.4–10.2)
CHLORIDE SERPL-SCNC: 107 MMOL/L (ref 96–108)
CO2 SERPL-SCNC: 28 MMOL/L (ref 21–32)
CREAT SERPL-MCNC: 0.77 MG/DL (ref 0.6–1.3)
EOSINOPHIL # BLD AUTO: 0.12 THOUSAND/ÂΜL (ref 0–0.61)
EOSINOPHIL NFR BLD AUTO: 3 % (ref 0–6)
ERYTHROCYTE [DISTWIDTH] IN BLOOD BY AUTOMATED COUNT: 12.5 % (ref 11.6–15.1)
GFR SERPL CREATININE-BSD FRML MDRD: 91 ML/MIN/1.73SQ M
GLUCOSE P FAST SERPL-MCNC: 96 MG/DL (ref 65–99)
HCT VFR BLD AUTO: 43.1 % (ref 36.5–49.3)
HGB BLD-MCNC: 15 G/DL (ref 12–17)
IMM GRANULOCYTES # BLD AUTO: 0 THOUSAND/UL (ref 0–0.2)
IMM GRANULOCYTES NFR BLD AUTO: 0 % (ref 0–2)
LYMPHOCYTES # BLD AUTO: 1.43 THOUSANDS/ÂΜL (ref 0.6–4.47)
LYMPHOCYTES NFR BLD AUTO: 30 % (ref 14–44)
MCH RBC QN AUTO: 33.2 PG (ref 26.8–34.3)
MCHC RBC AUTO-ENTMCNC: 34.8 G/DL (ref 31.4–37.4)
MCV RBC AUTO: 95 FL (ref 82–98)
MONOCYTES # BLD AUTO: 0.44 THOUSAND/ÂΜL (ref 0.17–1.22)
MONOCYTES NFR BLD AUTO: 9 % (ref 4–12)
NEUTROPHILS # BLD AUTO: 2.75 THOUSANDS/ÂΜL (ref 1.85–7.62)
NEUTS SEG NFR BLD AUTO: 57 % (ref 43–75)
NRBC BLD AUTO-RTO: 0 /100 WBCS
PLATELET # BLD AUTO: 184 THOUSANDS/UL (ref 149–390)
PMV BLD AUTO: 10.5 FL (ref 8.9–12.7)
POTASSIUM SERPL-SCNC: 4 MMOL/L (ref 3.5–5.3)
RBC # BLD AUTO: 4.52 MILLION/UL (ref 3.88–5.62)
SODIUM SERPL-SCNC: 141 MMOL/L (ref 135–147)
WBC # BLD AUTO: 4.79 THOUSAND/UL (ref 4.31–10.16)

## 2023-05-25 LAB
ATRIAL RATE: 51 BPM
BACTERIA UR CULT: NORMAL
P AXIS: 56 DEGREES
PR INTERVAL: 158 MS
QRS AXIS: 7 DEGREES
QRSD INTERVAL: 100 MS
QT INTERVAL: 424 MS
QTC INTERVAL: 390 MS
T WAVE AXIS: 25 DEGREES
VENTRICULAR RATE: 51 BPM

## 2023-05-30 NOTE — PRE-PROCEDURE INSTRUCTIONS
Pre-Surgery Instructions:   Medication Instructions   • Diclofenac Sodium (VOLTAREN) 1 % Stop taking 3 days prior to surgery  • tadalafil (CIALIS) 20 MG tablet Uses PRN- DO NOT take day of surgery   See above      Kristine Pino Medication instructions for day surgery reviewed  Please use only a sip of water to take your instructed medications  Avoid all over the counter vitamins, supplements and NSAIDS for one week prior to surgery per anesthesia guidelines  Tylenol is ok to take as needed  You will receive a call one business day prior to surgery with an arrival time and hospital directions  If your surgery is scheduled on a Monday, the hospital will be calling you on the Friday prior to your surgery  If you have not heard from anyone by 8pm, please call the hospital supervisor through the hospital  at [phone]  Jackson Jain [phone])  Do not eat or drink anything after midnight the night before your surgery, including candy, mints, lifesavers, or chewing gum  Do not drink alcohol 24hrs before your surgery  Try not to smoke at least 24hrs before your surgery  Follow the pre surgery showering instructions as listed in the [facility] Surgical Experience Booklet” or otherwise provided by your surgeon's office  Do not shave the surgical area 24 hours before surgery  Do not apply any lotions, creams, including makeup, cologne, deodorant, or perfumes after showering on the day of your surgery  No contact lenses, eye make-up, or artificial eyelashes  Remove nail polish, including gel polish, and any artificial, gel, or acrylic nails if possible  Remove all jewelry including rings and body piercing jewelry  Wear causal clothing that is easy to take on and off  Consider your type of surgery  Keep any valuables, jewelry, piercings at home  Please bring any specially ordered equipment (sling, braces) if indicated      Arrange for a responsible person to drive you to and from the hospital on the day of your surgery  Visitor Guidelines discussed  Call the surgeon's office with any new illnesses, exposures, or additional questions prior to surgery  Please reference your [facility] Surgical Experience Booklet” for additional information to prepare for your upcoming surgery

## 2023-06-02 ENCOUNTER — ANESTHESIA EVENT (OUTPATIENT)
Dept: PERIOP | Facility: HOSPITAL | Age: 73
End: 2023-06-02
Payer: MEDICARE

## 2023-06-02 NOTE — ANESTHESIA PREPROCEDURE EVALUATION
Procedure:  CYSTOSCOPY WITH INSERTION UROLIFT (Urethra)    Relevant Problems   No relevant active problems      Hx nephrolithiasis      Physical Exam    Airway    Mallampati score: II  TM Distance: >3 FB  Neck ROM: full     Dental   No notable dental hx     Cardiovascular  Cardiovascular exam normal    Pulmonary  Pulmonary exam normal     Other Findings    5/24/23 Sinus Daljit    Anesthesia Plan  ASA Score- 2     Anesthesia Type- IV sedation with anesthesia with ASA Monitors  Additional Monitors:   Airway Plan:           Plan Factors-Exercise tolerance (METS): >4 METS  Chart reviewed  EKG reviewed  Imaging results reviewed  Existing labs reviewed  Patient summary reviewed  Patient is not a current smoker  Patient not instructed to abstain from smoking on day of procedure  Patient did not smoke on day of surgery  Induction- intravenous  Postoperative Plan-     Informed Consent- Anesthetic plan and risks discussed with patient  I personally reviewed this patient with the CRNA  Discussed and agreed on the Anesthesia Plan with the CRNA             Surgical History  Current as of 06/02/23 1118  HERNIA REPAIR KNEE CARTILAGE SURGERY   PARAESOPHAGEAL HERNIA REPAIR EGD   COLONOSCOPY APPENDECTOMY   UVULECTOMY NASAL SEPTUM SURGERY

## 2023-06-05 ENCOUNTER — TELEPHONE (OUTPATIENT)
Dept: UROLOGY | Facility: CLINIC | Age: 73
End: 2023-06-05

## 2023-06-05 ENCOUNTER — HOSPITAL ENCOUNTER (OUTPATIENT)
Facility: HOSPITAL | Age: 73
Setting detail: OUTPATIENT SURGERY
Discharge: HOME | End: 2023-06-05
Attending: UROLOGY | Admitting: UROLOGY
Payer: MEDICARE

## 2023-06-05 ENCOUNTER — ANESTHESIA (OUTPATIENT)
Dept: PERIOP | Facility: HOSPITAL | Age: 73
End: 2023-06-05
Payer: MEDICARE

## 2023-06-05 VITALS
OXYGEN SATURATION: 96 % | BODY MASS INDEX: 23.7 KG/M2 | DIASTOLIC BLOOD PRESSURE: 65 MMHG | TEMPERATURE: 97.1 F | HEIGHT: 72 IN | RESPIRATION RATE: 20 BRPM | HEART RATE: 47 BPM | WEIGHT: 175 LBS | SYSTOLIC BLOOD PRESSURE: 144 MMHG

## 2023-06-05 DIAGNOSIS — R35.1 BENIGN PROSTATIC HYPERPLASIA WITH NOCTURIA: Primary | ICD-10-CM

## 2023-06-05 DIAGNOSIS — N40.1 BENIGN PROSTATIC HYPERPLASIA WITH NOCTURIA: Primary | ICD-10-CM

## 2023-06-05 PROCEDURE — L8699 PROSTHETIC IMPLANT NOS: HCPCS | Performed by: UROLOGY

## 2023-06-05 PROCEDURE — 52441 CYSTO INSJ TRNSPRSTC 1 IMPLT: CPT | Performed by: UROLOGY

## 2023-06-05 PROCEDURE — NC001 PR NO CHARGE: Performed by: UROLOGY

## 2023-06-05 PROCEDURE — 52442 CYSTO INS TRNSPRSTC IMPLT EA: CPT | Performed by: UROLOGY

## 2023-06-05 DEVICE — IMPLANT CARTRIDGE UROLIFT 2: Type: IMPLANTABLE DEVICE | Site: URETHRA | Status: FUNCTIONAL

## 2023-06-05 DEVICE — IMPLANT CARTRIDGE UROLIFT 2 HANDLE KIT: Type: IMPLANTABLE DEVICE | Site: URETHRA | Status: FUNCTIONAL

## 2023-06-05 RX ORDER — SODIUM CHLORIDE, SODIUM LACTATE, POTASSIUM CHLORIDE, CALCIUM CHLORIDE 600; 310; 30; 20 MG/100ML; MG/100ML; MG/100ML; MG/100ML
INJECTION, SOLUTION INTRAVENOUS CONTINUOUS PRN
Status: DISCONTINUED | OUTPATIENT
Start: 2023-06-05 | End: 2023-06-05

## 2023-06-05 RX ORDER — ONDANSETRON 2 MG/ML
INJECTION INTRAMUSCULAR; INTRAVENOUS AS NEEDED
Status: DISCONTINUED | OUTPATIENT
Start: 2023-06-05 | End: 2023-06-05

## 2023-06-05 RX ORDER — ONDANSETRON 2 MG/ML
4 INJECTION INTRAMUSCULAR; INTRAVENOUS ONCE AS NEEDED
Status: DISCONTINUED | OUTPATIENT
Start: 2023-06-05 | End: 2023-06-05 | Stop reason: HOSPADM

## 2023-06-05 RX ORDER — MIDAZOLAM HYDROCHLORIDE 2 MG/2ML
INJECTION, SOLUTION INTRAMUSCULAR; INTRAVENOUS AS NEEDED
Status: DISCONTINUED | OUTPATIENT
Start: 2023-06-05 | End: 2023-06-05

## 2023-06-05 RX ORDER — CEFUROXIME AXETIL 500 MG/1
500 TABLET ORAL EVERY 12 HOURS SCHEDULED
Qty: 14 TABLET | Refills: 0 | Status: SHIPPED | OUTPATIENT
Start: 2023-06-05 | End: 2023-06-12

## 2023-06-05 RX ORDER — FENTANYL CITRATE 50 UG/ML
INJECTION, SOLUTION INTRAMUSCULAR; INTRAVENOUS AS NEEDED
Status: DISCONTINUED | OUTPATIENT
Start: 2023-06-05 | End: 2023-06-05

## 2023-06-05 RX ORDER — SODIUM CHLORIDE, SODIUM LACTATE, POTASSIUM CHLORIDE, CALCIUM CHLORIDE 600; 310; 30; 20 MG/100ML; MG/100ML; MG/100ML; MG/100ML
75 INJECTION, SOLUTION INTRAVENOUS CONTINUOUS
Status: DISCONTINUED | OUTPATIENT
Start: 2023-06-05 | End: 2023-06-05 | Stop reason: HOSPADM

## 2023-06-05 RX ORDER — LIDOCAINE HYDROCHLORIDE 10 MG/ML
INJECTION, SOLUTION EPIDURAL; INFILTRATION; INTRACAUDAL; PERINEURAL AS NEEDED
Status: DISCONTINUED | OUTPATIENT
Start: 2023-06-05 | End: 2023-06-05

## 2023-06-05 RX ORDER — PROPOFOL 10 MG/ML
INJECTION, EMULSION INTRAVENOUS CONTINUOUS PRN
Status: DISCONTINUED | OUTPATIENT
Start: 2023-06-05 | End: 2023-06-05

## 2023-06-05 RX ORDER — ACETAMINOPHEN 325 MG/1
975 TABLET ORAL ONCE
Status: COMPLETED | OUTPATIENT
Start: 2023-06-05 | End: 2023-06-05

## 2023-06-05 RX ORDER — PHENAZOPYRIDINE HYDROCHLORIDE 100 MG/1
200 TABLET, FILM COATED ORAL
Status: DISCONTINUED | OUTPATIENT
Start: 2023-06-05 | End: 2023-06-05 | Stop reason: HOSPADM

## 2023-06-05 RX ORDER — OXYCODONE HYDROCHLORIDE AND ACETAMINOPHEN 5; 325 MG/1; MG/1
1-2 TABLET ORAL EVERY 6 HOURS PRN
Qty: 20 TABLET | Refills: 0 | Status: SHIPPED | OUTPATIENT
Start: 2023-06-05

## 2023-06-05 RX ORDER — CEFAZOLIN SODIUM 2 G/50ML
2000 SOLUTION INTRAVENOUS ONCE
Status: COMPLETED | OUTPATIENT
Start: 2023-06-05 | End: 2023-06-05

## 2023-06-05 RX ORDER — FENTANYL CITRATE/PF 50 MCG/ML
25 SYRINGE (ML) INJECTION
Status: DISCONTINUED | OUTPATIENT
Start: 2023-06-05 | End: 2023-06-05 | Stop reason: HOSPADM

## 2023-06-05 RX ORDER — MAGNESIUM HYDROXIDE 1200 MG/15ML
LIQUID ORAL AS NEEDED
Status: DISCONTINUED | OUTPATIENT
Start: 2023-06-05 | End: 2023-06-05 | Stop reason: HOSPADM

## 2023-06-05 RX ADMIN — LIDOCAINE HYDROCHLORIDE 50 MG: 10 INJECTION, SOLUTION EPIDURAL; INFILTRATION; INTRACAUDAL; PERINEURAL at 07:59

## 2023-06-05 RX ADMIN — FENTANYL CITRATE 25 MCG: 50 INJECTION INTRAMUSCULAR; INTRAVENOUS at 08:19

## 2023-06-05 RX ADMIN — ONDANSETRON 4 MG: 2 INJECTION INTRAMUSCULAR; INTRAVENOUS at 07:59

## 2023-06-05 RX ADMIN — PHENAZOPYRIDINE 200 MG: 100 TABLET ORAL at 08:59

## 2023-06-05 RX ADMIN — SODIUM CHLORIDE, SODIUM LACTATE, POTASSIUM CHLORIDE, AND CALCIUM CHLORIDE: .6; .31; .03; .02 INJECTION, SOLUTION INTRAVENOUS at 07:54

## 2023-06-05 RX ADMIN — PROPOFOL 60 MCG/KG/MIN: 10 INJECTION, EMULSION INTRAVENOUS at 07:59

## 2023-06-05 RX ADMIN — FENTANYL CITRATE 50 MCG: 50 INJECTION INTRAMUSCULAR; INTRAVENOUS at 07:59

## 2023-06-05 RX ADMIN — CEFAZOLIN SODIUM 2000 MG: 2 SOLUTION INTRAVENOUS at 08:00

## 2023-06-05 RX ADMIN — ACETAMINOPHEN 975 MG: 325 TABLET ORAL at 06:35

## 2023-06-05 RX ADMIN — MIDAZOLAM 2 MG: 1 INJECTION INTRAMUSCULAR; INTRAVENOUS at 07:54

## 2023-06-05 RX ADMIN — FENTANYL CITRATE 25 MCG: 50 INJECTION INTRAMUSCULAR; INTRAVENOUS at 08:05

## 2023-06-05 NOTE — TELEPHONE ENCOUNTER
----- Message from Jaron Campo MD sent at 6/5/2023  8:38 AM EDT -----  Please have him back for a postop check in 2 weeks    Thank you

## 2023-06-05 NOTE — OP NOTE
OPERATIVE REPORT  PATIENT NAME: Stephon Bro    :  1950  MRN: [id number]  Pt Location: [facility] OR ROOM 01    SURGERY DATE: 2023    Surgeon(s) and Role:     Komal Keen MD - Primary    Preop Diagnosis:  Urinary frequency [R35 0]    Post-Op Diagnosis Codes:     * Urinary frequency [R35 0]    Procedure(s):  CYSTOSCOPY WITH INSERTION UROLIFT    Specimen(s):  * No specimens in log *    Estimated Blood Loss:   Minimal    Drains:  Urethral Catheter 18 Fr  (Active)   Number of days: 0       Anesthesia Type:   IV Sedation with Anesthesia    Operative Indications:  Urinary frequency [R35 0]  BPH with obstruction    Operative Findings:  Patient was brought to the operating room  Hard timeout  SCDs in place  Antibiotics on board  Informed consent obtained  Dorsolithotomy position  Prepped and draped in sterile fashion  Cystoscopy with a UroLift scope was carried out  This revealed obstructing lobe  No median lobe no median bar  Trabeculations in the bladder  No tumors in the bladder  4 UroLift clips were placed  They were all placed at the verumontanum  They were stacked  This created a nice wide open channel  There was some minor bleeding at the end of the case  Patient had a coudé Calhoun placed  We will have a voiding trial in the recovery room  2 clips were placed on the right side  2 clips were placed on the left side  There were no complications  No projection of the clips into the bladder  Postoperative instructions follow-up arrangements and prescription taken care of  Complications:   None    Procedure and Technique:  See above   I was present for the entire procedure      Patient Disposition:  PACU         SIGNATURE: Isaiah Dupree MD  DATE: 2023  TIME: 8:23 AM

## 2023-06-05 NOTE — TELEPHONE ENCOUNTER
Left message with pt and wife to book an appt  for 2 week follow up with Dr Ana Maria Cesar in our office

## 2023-06-05 NOTE — H&P
H&P Exam - Urology   Fadia Kenney 72 y o  male MRN: [id number]  Unit/Bed#: OR POOL Encounter: [id number]    Assessment/Plan     Assessment:  BPH with obstruction  Plan:  UroLift procedure    History of Present Illness   HPI:  Fadia Kenney is a 72 y o  male who presents with history of BPH  He is interested in proceeding with UroLift procedure  He meets the criteria  Pros cons options outcomes discussed  Informed consent obtained  Review of Systems:  Const: Denies chills, fever and weight loss  CV: Denies chest pain  Resp: Denies SOB  GI: Denies abdominal pain, nausea and vomiting  : Denies symptoms other than stated above  Musculo: Denies back pain  Historical Information   Past Medical History:   Diagnosis Date   • Arthritis     bilateral knees; pt is getting knee injections   • Kidney stone      Past Surgical History:   Procedure Laterality Date   • APPENDECTOMY     • COLONOSCOPY     • EGD     • HERNIA REPAIR     • KNEE CARTILAGE SURGERY Left    • NASAL SEPTUM SURGERY     • PARAESOPHAGEAL HERNIA REPAIR     • UVULECTOMY       Social History   Social History     Substance and Sexual Activity   Alcohol Use Yes   • Alcohol/week: 2 0 standard drinks of alcohol   • Types: 2 Cans of beer per week    Comment: rarely     Social History     Substance and Sexual Activity   Drug Use Never     Social History     Tobacco Use   Smoking Status Never   Smokeless Tobacco Never     E-Cigarette/Vaping   • E-Cigarette Use Never User      E-Cigarette/Vaping Substances   • Nicotine No    • THC No    • CBD No    • Flavoring No    • Other No    • Unknown No      Family History: non-contributory    Meds/Allergies   all medications and allergies reviewed  No Known Allergies    Objective   Vitals: Blood pressure 145/72, pulse 57, temperature 97 9 °F (36 6 °C), temperature source Oral, resp  rate 18, height 6' (1 829 m), weight 79 4 kg (175 lb), SpO2 96 %  No intake/output data recorded      Invasive Devices Peripheral Intravenous Line  Duration           Peripheral IV 06/05/23 Dorsal (posterior); Left Hand <1 day                Physical Exam:  Physical Exam  Const: Appears healthy and well developed  No signs of acute distress present  Resp: Respirations are regular and unlabored  CV: Rate is regular  Rhythm is regular  Abdomen: Abdomen is soft, nontender, and nondistended  Kidneys are not palpable  : Normal penis  Psych: Patient's attitude is cooperative  Mood is normal  Affect is normal     Lab Results: I have personally reviewed pertinent reports  Imaging: I have personally reviewed pertinent reports  and I have personally reviewed pertinent films in PACS  EKG, Pathology, and Other Studies: I have personally reviewed pertinent reports  and I have personally reviewed pertinent films in PACS  VTE Prophylaxis: Sequential compression device Lamberto Bond)     Code Status: No Order  Advance Directive and Living Will:      Power of :    POLST:      Counseling / Coordination of Care  Total floor / unit time spent today 15 minutes  Greater than 50% of total time was spent with the patient and / or family counseling and / or coordination of care  A description of the counseling / coordination of care: Neo Edwards

## 2023-06-05 NOTE — TELEPHONE ENCOUNTER
----- Message from Kai Walters MD sent at 6/5/2023  8:38 AM EDT -----  Please have him back for a postop check in 2 weeks    Thank you

## 2023-06-05 NOTE — ANESTHESIA POSTPROCEDURE EVALUATION
Post-Op Assessment Note    CV Status:  Stable    Pain management: satisfactory to patient     Mental Status:  Sleepy   Hydration Status:  Stable   PONV Controlled:  None   Airway Patency:  Patent      Post Op Vitals Reviewed: Yes      Staff: CRNA         No notable events documented      BP   111/64   Temp   98 9   Pulse  58   Resp   12   SpO2   98

## 2023-06-19 ENCOUNTER — OFFICE VISIT (OUTPATIENT)
Dept: UROLOGY | Facility: CLINIC | Age: 73
End: 2023-06-19
Payer: MEDICARE

## 2023-06-19 VITALS
TEMPERATURE: 97.8 F | BODY MASS INDEX: 23.38 KG/M2 | WEIGHT: 172.6 LBS | SYSTOLIC BLOOD PRESSURE: 128 MMHG | DIASTOLIC BLOOD PRESSURE: 78 MMHG | OXYGEN SATURATION: 97 % | HEART RATE: 64 BPM | HEIGHT: 72 IN

## 2023-06-19 DIAGNOSIS — N40.1 BPH WITH URINARY OBSTRUCTION: Primary | ICD-10-CM

## 2023-06-19 DIAGNOSIS — N13.8 BPH WITH URINARY OBSTRUCTION: Primary | ICD-10-CM

## 2023-06-19 LAB
SL AMB  POCT GLUCOSE, UA: NEGATIVE
SL AMB LEUKOCYTE ESTERASE,UA: NEGATIVE
SL AMB POCT BILIRUBIN,UA: NEGATIVE
SL AMB POCT BLOOD,UA: ABNORMAL
SL AMB POCT CLARITY,UA: CLEAR
SL AMB POCT COLOR,UA: YELLOW
SL AMB POCT KETONES,UA: NEGATIVE
SL AMB POCT NITRITE,UA: NEGATIVE
SL AMB POCT PH,UA: 7
SL AMB POCT SPECIFIC GRAVITY,UA: 1.01
SL AMB POCT URINE PROTEIN: NEGATIVE
SL AMB POCT UROBILINOGEN: 0.2

## 2023-06-19 PROCEDURE — 81003 URINALYSIS AUTO W/O SCOPE: CPT | Performed by: UROLOGY

## 2023-06-19 PROCEDURE — 99213 OFFICE O/P EST LOW 20 MIN: CPT | Performed by: UROLOGY

## 2023-06-19 NOTE — PROGRESS NOTES
UROLOGY PROGRESS NOTE         NAME: Ranjit Espnioza  AGE: 72 y o  SEX: male  : 1950   MRN: [id number]    DATE: 2023  TIME: 11:11 AM    Assessment and Plan      Impression:   1  BPH with urinary obstruction    Status post UroLift  4 clips  2 weeks ago slowly improving   Plan: Follow-up 3 months  He will continue Cialis as needed for erectile dysfunction      Chief Complaint     Chief Complaint   Patient presents with   • Follow-up     History of Present Illness     HPI: Ranjit Espinoza is a 72y o  year old male who presents with status post UroLift 2 weeks ago  4 clips at the verumontanum  His nocturia has improved  Still somewhat bothersome 3-4 times per night  Previously was every 1/2 hour to hour  No incontinence  No fever chills  Exam is negative  Urine sample today  The following portions of the patient's history were reviewed and updated as appropriate: allergies, current medications, past family history, past medical history, past social history, past surgical history and problem list   Past Medical History:   Diagnosis Date   • Arthritis     bilateral knees; pt is getting knee injections   • Kidney stone      Past Surgical History:   Procedure Laterality Date   • APPENDECTOMY     • COLONOSCOPY     • EGD     • HERNIA REPAIR     • KNEE CARTILAGE SURGERY Left    • NASAL SEPTUM SURGERY     • PARAESOPHAGEAL HERNIA REPAIR     • [place] CYSTO INSERTION TRANSPROSTATIC IMPLANT SINGLE N/A 2023    Procedure: Prudy Quest;  Surgeon: Tamra Jo MD;  Location: [facility] OR;  Service: Urology   • UVULECTOMY       shoulder  Review of Systems     Const: Denies chills, fever and weight loss  CV: Denies chest pain  Resp: Denies SOB  GI: Denies abdominal pain, nausea and vomiting  : Denies symptoms other than stated above  Musculo: Denies back pain      Objective   /78 (BP Location: Left arm, Patient Position: Sitting, Cuff Size: Adult)   Pulse 64 Temp 97 8 °F (36 6 °C)   Ht 6' (1 829 m)   Wt 78 3 kg (172 lb 9 6 oz)   SpO2 97%   BMI 23 41 kg/m²     Physical Exam  Const: Appears healthy and well developed  No signs of acute distress present  Resp: Respirations are regular and unlabored  CV: Rate is regular  Rhythm is regular  Abdomen: Abdomen is soft, nontender, and nondistended  Kidneys are not palpable  : No testicular tenderness no suprapubic tenderness  Psych: Patient's attitude is cooperative   Mood is normal  Affect is normal     Current Medications     Current Outpatient Medications:   •  Diclofenac Sodium (VOLTAREN) 1 %, Apply 2 g topically if needed, Disp: , Rfl:   •  Aspirin Buf,CaCarb-MgCarb-MgO, 81 MG TABS, Take by mouth daily (Patient not taking: Reported on 5/30/2023), Disp: , Rfl:   •  naproxen (Naprosyn) 500 mg tablet, Take 1 tablet (500 mg total) by mouth 2 (two) times a day with meals (Patient not taking: Reported on 12/12/2022), Disp: 30 tablet, Rfl: 0  •  Omega-3 Fatty Acids (Fish Oil) 1000 MG CPDR, Take by mouth (Patient not taking: Reported on 3/13/2023), Disp: , Rfl:   •  ondansetron (ZOFRAN) 4 mg tablet, Take 1 tablet (4 mg total) by mouth every 6 (six) hours (Patient not taking: Reported on 11/15/2021), Disp: 12 tablet, Rfl: 0  •  oxyCODONE-acetaminophen (PERCOCET) 5-325 mg per tablet, Take 1 tablet by mouth every 4 (four) hours as needed for moderate pain for up to 10 doses Max Daily Amount: 6 tablets (Patient not taking: Reported on 11/15/2021), Disp: 10 tablet, Rfl: 0  •  oxyCODONE-acetaminophen (PERCOCET) 5-325 mg per tablet, Take 1-2 tablets by mouth every 6 (six) hours as needed for moderate pain or severe pain Max Daily Amount: 8 tablets (Patient not taking: Reported on 6/19/2023), Disp: 20 tablet, Rfl: 0  •  tadalafil (CIALIS) 20 MG tablet, Take 20 mg by mouth as needed (Patient not taking: Reported on 6/19/2023), Disp: , Rfl:   •  tamsulosin (FLOMAX) 0 4 mg, Take 1 capsule (0 4 mg total) by mouth daily with dinner for 10 days (Patient not taking: Reported on 3/27/2023), Disp: 30 capsule, Rfl: 3        Lolis Canchola MD

## 2023-09-22 ENCOUNTER — OFFICE VISIT (OUTPATIENT)
Dept: UROLOGY | Facility: CLINIC | Age: 73
End: 2023-09-22
Payer: MEDICARE

## 2023-09-22 VITALS
HEIGHT: 72 IN | WEIGHT: 172.8 LBS | HEART RATE: 50 BPM | SYSTOLIC BLOOD PRESSURE: 138 MMHG | TEMPERATURE: 98.6 F | BODY MASS INDEX: 23.4 KG/M2 | DIASTOLIC BLOOD PRESSURE: 70 MMHG | OXYGEN SATURATION: 99 %

## 2023-09-22 DIAGNOSIS — N40.1 BENIGN PROSTATIC HYPERPLASIA WITH NOCTURIA: Primary | ICD-10-CM

## 2023-09-22 DIAGNOSIS — R35.1 BENIGN PROSTATIC HYPERPLASIA WITH NOCTURIA: Primary | ICD-10-CM

## 2023-09-22 PROCEDURE — 99213 OFFICE O/P EST LOW 20 MIN: CPT | Performed by: UROLOGY

## 2023-09-22 RX ORDER — DIPHENOXYLATE HYDROCHLORIDE AND ATROPINE SULFATE 2.5; .025 MG/1; MG/1
1 TABLET ORAL DAILY
COMMUNITY

## 2023-09-22 NOTE — PROGRESS NOTES
UROLOGY PROGRESS NOTE         NAME: Nallely Mccall  AGE: 72 y.o. SEX: male  : 1950   MRN: [id number]    DATE: 2023  TIME: 11:15 AM    Assessment and Plan      Impression:   1. Benign prostatic hyperplasia with nocturia    History of stone disease. Doing well. Status post UroLift. Roughly 3 months out. Symptoms still improving. Patient is happy with the outcome.  Plan: Patient is currently off all medications. He will follow-up in a year with a PSA sooner if any troubles      Chief Complaint     Chief Complaint   Patient presents with   • Follow-up     History of Present Illness     HPI: Nallely Mccall is a 72y.o. year old male who presents with status post UroLift. History of kidney stones. Patient is doing well. Nocturia twice per night. But some of the nocturia may be related to the leg pain. Urgency frequency flow all improved. No incontinence. No stone troubles. The following portions of the patient's history were reviewed and updated as appropriate: allergies, current medications, past family history, past medical history, past social history, past surgical history and problem list.  Past Medical History:   Diagnosis Date   • Arthritis     bilateral knees; pt is getting knee injections   • Kidney stone      Past Surgical History:   Procedure Laterality Date   • APPENDECTOMY     • COLONOSCOPY     • EGD     • HERNIA REPAIR     • KNEE CARTILAGE SURGERY Left    • NASAL SEPTUM SURGERY     • PARAESOPHAGEAL HERNIA REPAIR     • [place] CYSTO INSERTION TRANSPROSTATIC IMPLANT SINGLE N/A 2023    Procedure: Magdalena Ugarte;  Surgeon: Lea Najera MD;  Location:  MAIN OR;  Service: Urology   • UVULECTOMY       shoulder  Review of Systems     Const: Denies chills, fever and weight loss. CV: Denies chest pain. Resp: Denies SOB. GI: Denies abdominal pain, nausea and vomiting. : Denies symptoms other than stated above.   Musculo: Denies back pain.    Objective   /70   Pulse (!) 50   Temp 98.6 °F (37 °C)   Ht 6' (1.829 m)   Wt 78.4 kg (172 lb 12.8 oz)   SpO2 99%   BMI 23.44 kg/m²     Physical Exam  Const: Appears healthy and well developed. No signs of acute distress present. Resp: Respirations are regular and unlabored. CV: Rate is regular. Rhythm is regular. Abdomen: Abdomen is soft, nontender, and nondistended. Kidneys are not palpable. : No testicular tenderness. No hernias  Psych: Patient's attitude is cooperative.  Mood is normal. Affect is normal.    Current Medications     Current Outpatient Medications:   •  Diclofenac Sodium (VOLTAREN) 1 %, Apply 2 g topically if needed, Disp: , Rfl:   •  multivitamin (THERAGRAN) TABS, Take 1 tablet by mouth daily, Disp: , Rfl:   •  tadalafil (CIALIS) 20 MG tablet, Take 20 mg by mouth as needed, Disp: , Rfl:   •  Aspirin Buf,CaCarb-MgCarb-MgO, 81 MG TABS, Take by mouth daily (Patient not taking: Reported on 5/30/2023), Disp: , Rfl:   •  naproxen (Naprosyn) 500 mg tablet, Take 1 tablet (500 mg total) by mouth 2 (two) times a day with meals (Patient not taking: Reported on 12/12/2022), Disp: 30 tablet, Rfl: 0  •  Omega-3 Fatty Acids (Fish Oil) 1000 MG CPDR, Take by mouth (Patient not taking: Reported on 3/13/2023), Disp: , Rfl:   •  ondansetron (ZOFRAN) 4 mg tablet, Take 1 tablet (4 mg total) by mouth every 6 (six) hours (Patient not taking: Reported on 11/15/2021), Disp: 12 tablet, Rfl: 0  •  oxyCODONE-acetaminophen (PERCOCET) 5-325 mg per tablet, Take 1 tablet by mouth every 4 (four) hours as needed for moderate pain for up to 10 doses Max Daily Amount: 6 tablets (Patient not taking: Reported on 11/15/2021), Disp: 10 tablet, Rfl: 0  •  oxyCODONE-acetaminophen (PERCOCET) 5-325 mg per tablet, Take 1-2 tablets by mouth every 6 (six) hours as needed for moderate pain or severe pain Max Daily Amount: 8 tablets (Patient not taking: Reported on 6/19/2023), Disp: 20 tablet, Rfl: 0  •  tamsulosin (FLOMAX) 0.4 mg, Take 1 capsule (0.4 mg total) by mouth daily with dinner for 10 days (Patient not taking: Reported on 3/27/2023), Disp: 30 capsule, Rfl: 3        Shiloh Cardenas MD

## 2024-01-10 ENCOUNTER — HOSPITAL ENCOUNTER (OUTPATIENT)
Dept: RADIOLOGY | Facility: HOSPITAL | Age: 74
Discharge: HOME | End: 2024-01-10
Payer: MEDICARE

## 2024-01-10 ENCOUNTER — APPOINTMENT (OUTPATIENT)
Dept: LAB | Facility: HOSPITAL | Age: 74
End: 2024-01-10
Payer: MEDICARE

## 2024-01-10 DIAGNOSIS — M79.604 PAIN IN RIGHT LEG: ICD-10-CM

## 2024-01-10 DIAGNOSIS — M79.605 PAIN IN LEFT LEG: ICD-10-CM

## 2024-01-10 DIAGNOSIS — M79.672 LEFT FOOT PAIN: ICD-10-CM

## 2024-01-10 DIAGNOSIS — M62.81 MUSCLE WEAKNESS (GENERALIZED): ICD-10-CM

## 2024-01-10 DIAGNOSIS — M54.50 LOW BACK PAIN, UNSPECIFIED BACK PAIN LATERALITY, UNSPECIFIED CHRONICITY, UNSPECIFIED WHETHER SCIATICA PRESENT: ICD-10-CM

## 2024-01-10 LAB
ALBUMIN SERPL BCP-MCNC: 4.4 G/DL (ref 3.5–5)
ALP SERPL-CCNC: 60 U/L (ref 34–104)
ALT SERPL W P-5'-P-CCNC: 26 U/L (ref 7–52)
ANION GAP SERPL CALCULATED.3IONS-SCNC: 6 MMOL/L
AST SERPL W P-5'-P-CCNC: 22 U/L (ref 13–39)
BASOPHILS # BLD AUTO: 0.02 THOUSANDS/ÂΜL (ref 0–0.1)
BASOPHILS NFR BLD AUTO: 0 % (ref 0–1)
BILIRUB SERPL-MCNC: 1.38 MG/DL (ref 0.2–1)
BUN SERPL-MCNC: 14 MG/DL (ref 5–25)
CALCIUM SERPL-MCNC: 9.5 MG/DL (ref 8.4–10.2)
CHLORIDE SERPL-SCNC: 105 MMOL/L (ref 96–108)
CO2 SERPL-SCNC: 29 MMOL/L (ref 21–32)
CREAT SERPL-MCNC: 0.7 MG/DL (ref 0.6–1.3)
CRP SERPL HS-MCNC: 1.51 MG/L
EOSINOPHIL # BLD AUTO: 0.08 THOUSAND/ÂΜL (ref 0–0.61)
EOSINOPHIL NFR BLD AUTO: 2 % (ref 0–6)
ERYTHROCYTE [DISTWIDTH] IN BLOOD BY AUTOMATED COUNT: 12.2 % (ref 11.6–15.1)
GFR SERPL CREATININE-BSD FRML MDRD: 94 ML/MIN/1.73SQ M
GLUCOSE SERPL-MCNC: 93 MG/DL (ref 65–140)
HCT VFR BLD AUTO: 45.7 % (ref 36.5–49.3)
HGB BLD-MCNC: 15.7 G/DL (ref 12–17)
IMM GRANULOCYTES # BLD AUTO: 0.01 THOUSAND/UL (ref 0–0.2)
IMM GRANULOCYTES NFR BLD AUTO: 0 % (ref 0–2)
LYMPHOCYTES # BLD AUTO: 1.32 THOUSANDS/ÂΜL (ref 0.6–4.47)
LYMPHOCYTES NFR BLD AUTO: 26 % (ref 14–44)
MAGNESIUM SERPL-MCNC: 1.9 MG/DL (ref 1.9–2.7)
MCH RBC QN AUTO: 33.5 PG (ref 26.8–34.3)
MCHC RBC AUTO-ENTMCNC: 34.4 G/DL (ref 31.4–37.4)
MCV RBC AUTO: 98 FL (ref 82–98)
MONOCYTES # BLD AUTO: 0.46 THOUSAND/ÂΜL (ref 0.17–1.22)
MONOCYTES NFR BLD AUTO: 9 % (ref 4–12)
NEUTROPHILS # BLD AUTO: 3.17 THOUSANDS/ÂΜL (ref 1.85–7.62)
NEUTS SEG NFR BLD AUTO: 63 % (ref 43–75)
NRBC BLD AUTO-RTO: 0 /100 WBCS
PLATELET # BLD AUTO: 191 THOUSANDS/UL (ref 149–390)
PMV BLD AUTO: 10.2 FL (ref 8.9–12.7)
POTASSIUM SERPL-SCNC: 4.1 MMOL/L (ref 3.5–5.3)
PROT SERPL-MCNC: 6.9 G/DL (ref 6.4–8.4)
RBC # BLD AUTO: 4.68 MILLION/UL (ref 3.88–5.62)
SODIUM SERPL-SCNC: 140 MMOL/L (ref 135–147)
WBC # BLD AUTO: 5.06 THOUSAND/UL (ref 4.31–10.16)

## 2024-01-10 PROCEDURE — 83735 ASSAY OF MAGNESIUM: CPT

## 2024-01-10 PROCEDURE — 85025 COMPLETE CBC W/AUTO DIFF WBC: CPT

## 2024-01-10 PROCEDURE — 72100 X-RAY EXAM L-S SPINE 2/3 VWS: CPT

## 2024-01-10 PROCEDURE — 80053 COMPREHEN METABOLIC PANEL: CPT

## 2024-01-10 PROCEDURE — 86141 C-REACTIVE PROTEIN HS: CPT

## 2024-01-10 PROCEDURE — 36415 COLL VENOUS BLD VENIPUNCTURE: CPT

## 2024-02-22 ENCOUNTER — HOSPITAL ENCOUNTER (OUTPATIENT)
Dept: NON INVASIVE DIAGNOSTICS | Facility: HOSPITAL | Age: 74
Discharge: HOME | End: 2024-02-22
Payer: MEDICARE

## 2024-02-22 DIAGNOSIS — M79.604 PAIN IN RIGHT LEG: ICD-10-CM

## 2024-02-22 DIAGNOSIS — M79.605 PAIN IN LEFT LEG: ICD-10-CM

## 2024-02-22 DIAGNOSIS — I87.2 VENOUS INSUFFICIENCY (CHRONIC) (PERIPHERAL): ICD-10-CM

## 2024-02-22 PROCEDURE — 93970 EXTREMITY STUDY: CPT | Performed by: SURGERY

## 2024-02-22 PROCEDURE — 93970 EXTREMITY STUDY: CPT

## 2024-05-06 ENCOUNTER — EVALUATION (OUTPATIENT)
Dept: PHYSICAL THERAPY | Facility: CLINIC | Age: 74
End: 2024-05-06
Payer: MEDICARE

## 2024-05-06 VITALS — OXYGEN SATURATION: 98 % | DIASTOLIC BLOOD PRESSURE: 84 MMHG | HEART RATE: 62 BPM | SYSTOLIC BLOOD PRESSURE: 124 MMHG

## 2024-05-06 DIAGNOSIS — M94.261 CHONDROMALACIA OF RIGHT KNEE: ICD-10-CM

## 2024-05-06 DIAGNOSIS — Z98.890 STATUS POST ARTHROSCOPY OF RIGHT KNEE: ICD-10-CM

## 2024-05-06 DIAGNOSIS — S83.241D TEAR OF MEDIAL MENISCUS OF RIGHT KNEE, UNSPECIFIED TEAR TYPE, UNSPECIFIED WHETHER OLD OR CURRENT TEAR, SUBSEQUENT ENCOUNTER: Primary | ICD-10-CM

## 2024-05-06 DIAGNOSIS — S83.281D TEAR OF LATERAL MENISCUS OF RIGHT KNEE, UNSPECIFIED TEAR TYPE, UNSPECIFIED WHETHER OLD OR CURRENT TEAR, SUBSEQUENT ENCOUNTER: ICD-10-CM

## 2024-05-06 PROCEDURE — 97161 PT EVAL LOW COMPLEX 20 MIN: CPT | Performed by: PHYSICAL THERAPIST

## 2024-05-06 PROCEDURE — 97530 THERAPEUTIC ACTIVITIES: CPT | Performed by: PHYSICAL THERAPIST

## 2024-05-06 PROCEDURE — 97110 THERAPEUTIC EXERCISES: CPT | Performed by: PHYSICAL THERAPIST

## 2024-05-06 NOTE — PROGRESS NOTES
PT Evaluation     Today's date: 2024  Patient name: Damien Finch  : 1950  MRN: [id number]  Referring provider: Wiliam Mello MD  Dx:   Encounter Diagnosis     ICD-10-CM    1. Tear of medial meniscus of right knee, unspecified tear type, unspecified whether old or current tear, subsequent encounter  S83.241D       2. Tear of lateral meniscus of right knee, unspecified tear type, unspecified whether old or current tear, subsequent encounter  S83.281D       3. Chondromalacia of right knee  M94.261       4. Status post arthroscopy of right knee  Z98.890                      Assessment  Assessment details: 73 yo male s/p R Knee arthroscopy  for partial medial and partial lateral meniscal tears, chrondroplasty of trochlea, removal of loose bodies and cyst following knee pain and limited functional weight bearing tolerances and injection series. Pt today presents with impairments of ROM, strength, single limb balance, tolerance for WB function levels,  down grades and stairs, swelling and pain.  Pt is excellent candidate for PT to achieve goals of care. Pt educated in healing and activity modification to allow for healing s/p arthroscopy.   Impairments: abnormal or restricted ROM, impaired physical strength, lacks appropriate home exercise program and weight-bearing intolerance  Other impairment: SLB RLE 15 seconds, LLE 30 seconds    Symptom irritability: lowBarriers to therapy: None known  Understanding of Dx/Px/POC: excellent  Goals  Goals  STG   1-2 weeks:  Patient to develop R knee AROM 0- 125-130 degrees   Patient to develop R knee strength 4+/5   Decrease swelling by 1 cm at knee joint level  Patient to develop independence with HEP to assist with goal achievements.    LTG  2-4 weeks  Patient to develop R knee ROM 0-130 degrees to return to amb long distances with out difficulty   Patient to develop R knee Strength 4+/5 to 5/5 to allow for return to ambulation on stairs with reciprocal gait with  "no difficulty  Patient to develop R knee  ROM and strength to allow for return to homekeeping  and yard work with no to little difficulty  Patient to develop R knee  ROM and strength to return to driving with no difficulty as appropriate   Patient to develop strength 4+/5 to 5/5 R knee to amb down grades with out difficulty      Plan  Patient would benefit from: skilled physical therapy  Planned modality interventions: cryotherapy and unattended electrical stimulation  Planned therapy interventions: manual therapy, neuromuscular re-education, patient education, functional ROM exercises, graded exercise, home exercise program, therapeutic exercise, therapeutic activities, strengthening and stretching  Frequency: 2x week  Duration in weeks: 4  Plan of Care beginning date: 5/6/2024  Plan of Care expiration date: 6/3/2024  Treatment plan discussed with: patient        Subjective Evaluation    History of Present Illness  Mechanism of injury: surgery  Mechanism of injury: Pt notes fall 2 years ago  and injury to R knee but had been having injections in B knees secondary to some pain.  He notes R knee tx with injection series but did not help. He notes worsening of pain and progressive decrease in weight bearing tolerances limited recreation, walking and standing. He noted mild swelling pre operatively and pain and limitations with bending knee  with painful crack or \"creaking\" with bending knee.  He  underwent arthroscopic debridement 5/2/24 by Dr. Mello  in [place]. He noted good tolerance to surgery.  He noted PMH of  prostrate and urethra difficulty, denies any allergies. He notes medications of  81 asa, pain medication at night for knee, tyelnol arthritis, antiinflammatory meds name unknown.  Hx of Stomach surgery - laproscopy  years ago. .   Quality of life: good    Patient Goals  Patient goals for therapy: return to sport/leisure activities and decreased edema  Patient goal: to return to walking for hunting, to " return to full weight bearing function, to amb down staira dn grades without diffiuclty  Pain  Current pain ratin  At best pain ratin  At worst pain rating: 3  Quality: discomfort  Relieving factors: ice and medications  Aggravating factors: walking and standing  Progression: improved    Social Support  Lives in: multiple-level home  Lives with: spouse    Employment status: not working  Hand dominance: left          Objective     Observations     Additional Observation Details  Post op arthroscopy sites infrapatellar region R knee intact with steristrips with no redness or signs of infection present    Palpation     Right   No palpable tenderness to the distal biceps femoris, distal semimembranosus, distal semitendinosus, lateral gastrocnemius, medial gastrocnemius, rectus femoris, vastus lateralis and vastus medialis.     Neurological Testing     Sensation     Knee   Left Knee   Intact: Light touch    Right Knee   Intact: light touch     Active Range of Motion   Left Knee   Flexion: 133 degrees   Extension: 0 degrees     Right Knee   Flexion: 120 degrees   Extension: 5 degrees     Patellar Static Positioning   Left Knee: WFL  Right Knee: WFL    Strength/Myotome Testing     Left Knee   Normal strength  Quadriceps contraction: good    Right Knee   Flexion: 4  Extension: 4  Quadriceps contraction: fair    Additional Strength Details  R LE Ankle df  4+/5, pf 4+/5  Hip extension 4+/5, abduction 4+/5 RLE    Swelling     Left Knee Girth Measurement (cm)   Joint line: 37.5 cm  10 cm above joint line: 41 cm  10 cm below joint line: 34.7 cm    Right Knee Girth Measurement (cm)   Joint line: 38.5 cm  10 cm above joint line: 41.3 cm  10 cm below joint line: 35 cm             Precautions:  arthroscopy chondro trochlea R knee, med/lat men tears      Daily Treatment Diary:      Initial Evaluation Date: 24  Compliance                      Visit Number 1                    Re-Eval  HERON Celeste  "Captured Y                           5/6                     Manual                      PROM R knee -                     STM edema reduc -                     STM scars -                     Ther-Ex                      QS 10x5\"                     ASLR w/QS 10x                     Heel slides with strap for gentle stretch 10x5\"                     Self calf stretch with strap 3 x 30\"                     HR 15x                                                                                                                                   Neuro Re-Ed                      Mini squats -                     SLB 3 x 15\"            lunge -            Step up, lat step up -                         Ther-Act              HEP, pt educ tx plan, activity mods, elevation, self mgmt PC                                                Modalities                      CP                                                     Access Code: [email or web address]  URL: [email or web address]  Date: 05/06/2024  Prepared by: Raisa Lanza    Exercises  - Long Sitting Calf Stretch with Strap  - 2 x daily - 7 x weekly - 1 sets - 3 reps - 30 hold  - Long Sitting Quad Set  - 2 x daily - 7 x weekly - 2 sets - 10 reps  - Sitting Heel Slide with Towel  - 2 x daily - 7 x weekly - 1 sets - 10 reps - 5 hold  - Active Straight Leg Raise with Quad Set  - 2 x daily - 7 x weekly - 1 sets - 10 reps - 2 hold  - Seated Long Arc Quad  - 2 x daily - 7 x weekly - 1 sets - 10 reps - 3 hold  - Heel Raises with Counter Support  - 2 x daily - 7 x weekly - 1 sets - 10 reps - 2 hold  - Single Leg Stance with Support  - 2 x daily - 7 x weekly - 1 sets - 3 reps    Patient Education  - Leg Elevation for Swelling    "

## 2024-05-06 NOTE — LETTER
May 6, 2024    Wiliam Mello MD  [address] [place] [address]     Patient: Damien Finch   YOB: 1950   Date of Visit: 2024     Encounter Diagnosis     ICD-10-CM    1. Tear of medial meniscus of right knee, unspecified tear type, unspecified whether old or current tear, subsequent encounter  S83.241D       2. Tear of lateral meniscus of right knee, unspecified tear type, unspecified whether old or current tear, subsequent encounter  S83.281D       3. Chondromalacia of right knee  M94.261       4. Status post arthroscopy of right knee  Z98.890           Dear Dr. Mello:    Thank you for your recent referral of Damien Finch. Please review the attached evaluation summary from Damien's recent visit.     Please verify that you agree with the plan of care by signing the attached order.     If you have any questions or concerns, please do not hesitate to call.     I sincerely appreciate the opportunity to share in the care of one of your patients and hope to have another opportunity to work with you in the near future.       Sincerely,    Raisa Lanza, PT      Referring Provider:      I certify that I have read the below Plan of Care and certify the need for these services furnished under this plan of treatment while under my care.                    Wiliam Mello MD  [address] [place] [address]   Via Fax: [phone]          PT Evaluation     Today's date: 2024  Patient name: Damien Finch  : 1950  MRN: [id number]  Referring provider: Wiliam Mello MD  Dx:   Encounter Diagnosis     ICD-10-CM    1. Tear of medial meniscus of right knee, unspecified tear type, unspecified whether old or current tear, subsequent encounter  S83.241D       2. Tear of lateral meniscus of right knee, unspecified tear type, unspecified whether old or current tear, subsequent encounter  S83.281D       3. Chondromalacia of right knee  M94.261       4. Status post arthroscopy of  right knee  Z98.890                      Assessment  Assessment details: 73 yo male s/p R Knee arthroscopy 5/2 for partial medial and partial lateral meniscal tears, chrondroplasty of trochlea, removal of loose bodies and cyst following knee pain and limited functional weight bearing tolerances and injection series. Pt today presents with impairments of ROM, strength, single limb balance, tolerance for WB function levels,  down grades and stairs, swelling and pain.  Pt is excellent candidate for PT to achieve goals of care. Pt educated in healing and activity modification to allow for healing s/p arthroscopy.   Impairments: abnormal or restricted ROM, impaired physical strength, lacks appropriate home exercise program and weight-bearing intolerance  Other impairment: SLB RLE 15 seconds, LLE 30 seconds    Symptom irritability: lowBarriers to therapy: None known  Understanding of Dx/Px/POC: excellent  Goals  Goals  STG   1-2 weeks:  Patient to develop R knee AROM 0- 125-130 degrees   Patient to develop R knee strength 4+/5   Decrease swelling by 1 cm at knee joint level  Patient to develop independence with HEP to assist with goal achievements.    LTG  2-4 weeks  Patient to develop R knee ROM 0-130 degrees to return to amb long distances with out difficulty   Patient to develop R knee Strength 4+/5 to 5/5 to allow for return to ambulation on stairs with reciprocal gait with no difficulty  Patient to develop R knee  ROM and strength to allow for return to homekeeping  and yard work with no to little difficulty  Patient to develop R knee  ROM and strength to return to driving with no difficulty as appropriate   Patient to develop strength 4+/5 to 5/5 R knee to amb down grades with out difficulty      Plan  Patient would benefit from: skilled physical therapy  Planned modality interventions: cryotherapy and unattended electrical stimulation  Planned therapy interventions: manual therapy, neuromuscular re-education,  "patient education, functional ROM exercises, graded exercise, home exercise program, therapeutic exercise, therapeutic activities, strengthening and stretching  Frequency: 2x week  Duration in weeks: 4  Plan of Care beginning date: 2024  Plan of Care expiration date: 6/3/2024  Treatment plan discussed with: patient        Subjective Evaluation    History of Present Illness  Mechanism of injury: surgery  Mechanism of injury: Pt notes fall 2 years ago  and injury to R knee but had been having injections in B knees secondary to some pain.  He notes R knee tx with injection series but did not help. He notes worsening of pain and progressive decrease in weight bearing tolerances limited recreation, walking and standing. He noted mild swelling pre operatively and pain and limitations with bending knee  with painful crack or \"creaking\" with bending knee.  He  underwent arthroscopic debridement 24 by Dr. Mello  in [place]. He noted good tolerance to surgery.  He noted PMH of  prostrate and urethra difficulty, denies any allergies. He notes medications of  81 asa, pain medication at night for knee, tyelnol arthritis, antiinflammatory meds name unknown.  Hx of Stomach surgery - laproscopy  years ago. .   Quality of life: good    Patient Goals  Patient goals for therapy: return to sport/leisure activities and decreased edema  Patient goal: to return to walking for hunting, to return to full weight bearing function, to amb down staira dn grades without diffiuclty  Pain  Current pain ratin  At best pain ratin  At worst pain rating: 3  Quality: discomfort  Relieving factors: ice and medications  Aggravating factors: walking and standing  Progression: improved    Social Support  Lives in: multiple-level home  Lives with: spouse    Employment status: not working  Hand dominance: left          Objective     Observations     Additional Observation Details  Post op arthroscopy sites infrapatellar region R knee intact " "with steristrips with no redness or signs of infection present    Palpation     Right   No palpable tenderness to the distal biceps femoris, distal semimembranosus, distal semitendinosus, lateral gastrocnemius, medial gastrocnemius, rectus femoris, vastus lateralis and vastus medialis.     Neurological Testing     Sensation     Knee   Left Knee   Intact: Light touch    Right Knee   Intact: light touch     Active Range of Motion   Left Knee   Flexion: 133 degrees   Extension: 0 degrees     Right Knee   Flexion: 120 degrees   Extension: 5 degrees     Patellar Static Positioning   Left Knee: WFL  Right Knee: WFL    Strength/Myotome Testing     Left Knee   Normal strength  Quadriceps contraction: good    Right Knee   Flexion: 4  Extension: 4  Quadriceps contraction: fair    Additional Strength Details  R LE Ankle df  4+/5, pf 4+/5  Hip extension 4+/5, abduction 4+/5 RLE    Swelling     Left Knee Girth Measurement (cm)   Joint line: 37.5 cm  10 cm above joint line: 41 cm  10 cm below joint line: 34.7 cm    Right Knee Girth Measurement (cm)   Joint line: 38.5 cm  10 cm above joint line: 41.3 cm  10 cm below joint line: 35 cm             Precautions:  arthroscopy chondro trochlea R knee, med/lat men tears      Daily Treatment Diary:      Initial Evaluation Date: 05/06/24  Compliance 5/6                     Visit Number 1                    Re-Eval  IE                 MC   Foto Captured Y                           5/6                     Manual                      PROM R knee -                     STM edema reduc -                     STM scars -                     Ther-Ex                      QS 10x5\"                     ASLR w/QS 10x                     Heel slides with strap for gentle stretch 10x5\"                     Self calf stretch with strap 3 x 30\"                     HR 15x                                                                                                                                   Neuro " "Re-Ed                      Mini squats -                     SLB 3 x 15\"            lunge -            Step up, lat step up -                         Ther-Act              HEP, pt educ tx plan, activity mods, elevation, self mgmt PC                                                Modalities                      CP                                                     Access Code: [email or web address]  URL: [email or web address]  Date: 05/06/2024  Prepared by: Raisa Lanza    Exercises  - Long Sitting Calf Stretch with Strap  - 2 x daily - 7 x weekly - 1 sets - 3 reps - 30 hold  - Long Sitting Quad Set  - 2 x daily - 7 x weekly - 2 sets - 10 reps  - Sitting Heel Slide with Towel  - 2 x daily - 7 x weekly - 1 sets - 10 reps - 5 hold  - Active Straight Leg Raise with Quad Set  - 2 x daily - 7 x weekly - 1 sets - 10 reps - 2 hold  - Seated Long Arc Quad  - 2 x daily - 7 x weekly - 1 sets - 10 reps - 3 hold  - Heel Raises with Counter Support  - 2 x daily - 7 x weekly - 1 sets - 10 reps - 2 hold  - Single Leg Stance with Support  - 2 x daily - 7 x weekly - 1 sets - 3 reps    Patient Education  - Leg Elevation for Swelling                    "

## 2024-05-09 ENCOUNTER — OFFICE VISIT (OUTPATIENT)
Dept: PHYSICAL THERAPY | Facility: CLINIC | Age: 74
End: 2024-05-09
Payer: MEDICARE

## 2024-05-09 DIAGNOSIS — M94.261 CHONDROMALACIA OF RIGHT KNEE: ICD-10-CM

## 2024-05-09 DIAGNOSIS — S83.281D TEAR OF LATERAL MENISCUS OF RIGHT KNEE, UNSPECIFIED TEAR TYPE, UNSPECIFIED WHETHER OLD OR CURRENT TEAR, SUBSEQUENT ENCOUNTER: ICD-10-CM

## 2024-05-09 DIAGNOSIS — Z98.890 STATUS POST ARTHROSCOPY OF RIGHT KNEE: ICD-10-CM

## 2024-05-09 DIAGNOSIS — S83.241D TEAR OF MEDIAL MENISCUS OF RIGHT KNEE, UNSPECIFIED TEAR TYPE, UNSPECIFIED WHETHER OLD OR CURRENT TEAR, SUBSEQUENT ENCOUNTER: Primary | ICD-10-CM

## 2024-05-09 PROCEDURE — 97112 NEUROMUSCULAR REEDUCATION: CPT | Performed by: PHYSICAL THERAPIST

## 2024-05-09 PROCEDURE — 97110 THERAPEUTIC EXERCISES: CPT | Performed by: PHYSICAL THERAPIST

## 2024-05-09 NOTE — PROGRESS NOTES
"Daily Note     Today's date: 2024  Patient name: Damien Finch  : 1950  MRN: [id number]  Referring provider: Wiliam Mello MD  Dx:   Encounter Diagnosis     ICD-10-CM    1. Tear of medial meniscus of right knee, unspecified tear type, unspecified whether old or current tear, subsequent encounter  S83.241D       2. Tear of lateral meniscus of right knee, unspecified tear type, unspecified whether old or current tear, subsequent encounter  S83.281D       3. Chondromalacia of right knee  M94.261       4. Status post arthroscopy of right knee  Z98.890                      Subjective: Pt notes some soreness today rated 3/10 in inferior  and lateral knee. He notes  compliance with HEP and CP, elevation  Pt brought medication list in today - meloxicam, aspirin , tramadol Hcl, ondansetron, tylenol.        Objective: See treatment diary below. Pt amb with out AD with non antalgic gait. Incisions appear intact with steri strips, no signs of infection present. Mild generalized swelling about R knee today.  AROM R knee 2-125 degrees with tightness about lateral knee  with end range flexion. HEP reviewed  along with activity mods to promote healing and swelling control.      Assessment: Tolerated treatment well. Patient would benefit from continued PT to achieve goals of care.       Plan: Continue per plan of care.      Precautions:  arthroscopy chondro trochlea R knee, med/lat men tears      Daily Treatment Diary:      Initial Evaluation Date: 24  Compliance                    Visit Number 1 2                   Re-Eval  IE -                MC   Foto Captured Y -                                             Manual                      PROM R knee -                     STM edema reduc -                     STM scars -                     Ther-Ex                      QS 10x5\"  20x5\"                   ASLR w/QS 10x  2 x 10                   Heel slides with strap for gentle stretch 10x5\"  20x " "5\" Active                   Self calf stretch with strap 3 x 30\"  3 x 30\"                   HR 15x                     SAQ/ LAQ   2x10, 5\" ea                   Seated HS stretch    3 x 30\"                                                               nustep   L2, 5m                   Neuro Re-Ed                      Mini squats -  2 x 10                   SLB 3 x 15\" 3 x 20\"           lunge -            Step up, lat step up -                         Ther-Act              HEP, pt educ tx plan, activity mods, elevation, self mgmt PC  PC erview                                              Modalities                      CP                                                     Access Code: [email or web address]  URL: [email or web address]  Date: 05/06/2024  Prepared by: Raisa Lanza    Exercises  - Long Sitting Calf Stretch with Strap  - 2 x daily - 7 x weekly - 1 sets - 3 reps - 30 hold  - Long Sitting Quad Set  - 2 x daily - 7 x weekly - 2 sets - 10 reps  - Sitting Heel Slide with Towel  - 2 x daily - 7 x weekly - 1 sets - 10 reps - 5 hold  - Active Straight Leg Raise with Quad Set  - 2 x daily - 7 x weekly - 1 sets - 10 reps - 2 hold  - Seated Long Arc Quad  - 2 x daily - 7 x weekly - 1 sets - 10 reps - 3 hold  - Heel Raises with Counter Support  - 2 x daily - 7 x weekly - 1 sets - 10 reps - 2 hold  - Single Leg Stance with Support  - 2 x daily - 7 x weekly - 1 sets - 3 reps    Patient Education  - Leg Elevation for Swelling         "

## 2024-05-14 ENCOUNTER — OFFICE VISIT (OUTPATIENT)
Dept: PHYSICAL THERAPY | Facility: CLINIC | Age: 74
End: 2024-05-14
Payer: MEDICARE

## 2024-05-14 DIAGNOSIS — Z98.890 STATUS POST ARTHROSCOPY OF RIGHT KNEE: ICD-10-CM

## 2024-05-14 DIAGNOSIS — S83.281D TEAR OF LATERAL MENISCUS OF RIGHT KNEE, UNSPECIFIED TEAR TYPE, UNSPECIFIED WHETHER OLD OR CURRENT TEAR, SUBSEQUENT ENCOUNTER: ICD-10-CM

## 2024-05-14 DIAGNOSIS — S83.241D TEAR OF MEDIAL MENISCUS OF RIGHT KNEE, UNSPECIFIED TEAR TYPE, UNSPECIFIED WHETHER OLD OR CURRENT TEAR, SUBSEQUENT ENCOUNTER: Primary | ICD-10-CM

## 2024-05-14 DIAGNOSIS — M94.261 CHONDROMALACIA OF RIGHT KNEE: ICD-10-CM

## 2024-05-14 PROCEDURE — 97112 NEUROMUSCULAR REEDUCATION: CPT | Performed by: PHYSICAL THERAPIST

## 2024-05-14 PROCEDURE — 97110 THERAPEUTIC EXERCISES: CPT | Performed by: PHYSICAL THERAPIST

## 2024-05-14 NOTE — PROGRESS NOTES
"Daily Note     Today's date: 2024  Patient name: Damien Finch  : 1950  MRN: [id number]  Referring provider: Wiliam Mello MD  Dx:   Encounter Diagnosis     ICD-10-CM    1. Tear of medial meniscus of right knee, unspecified tear type, unspecified whether old or current tear, subsequent encounter  S83.241D       2. Tear of lateral meniscus of right knee, unspecified tear type, unspecified whether old or current tear, subsequent encounter  S83.281D       3. Chondromalacia of right knee  M94.261       4. Status post arthroscopy of right knee  Z98.890                      Subjective: Pt notes he is advancing his function, cont with CP and elevation use as well as HEP completion.  He notes good resolve of sx of Knee now as compared to pre op status.       Objective: See treatment diary below.  Pt was progressed to leg press , step up, lateral step up and eccentric step downs today. Mini squats completed in modified ROM to avoid crepitus of knee.  Swelling in knee is trace with no signs of infection present.       Assessment: Tolerated treatment well. Patient demonstrated fatigue post treatment.       Plan: Continue per plan of care.  Pt follows with his surgeon tomorrow and we will cont with care as ordered.      Precautions:  arthroscopy chondro trochlea R knee, med/lat men tears      Daily Treatment Diary:      Initial Evaluation Date: 24  Compliance                  Visit Number 1 2  3                 Re-Eval  IE -                MC   Foto Captured Y -                                           Manual                      PROM R knee -                     STM edema reduc -                     STM scars -                     Ther-Ex                      QS 10x5\"  20x5\"  10x10\"                 ASLR w/QS 10x  2 x 10  2 x 10 cues QS                 Heel slides with strap for gentle stretch 10x5\"  20x 5\" Active  20x5\" strap for stretch as ankur                 Self calf " "stretch with strap 3 x 30\"  3 x 30\"  3 x 30\"                 HR 15x    20x                 SAQ/ LAQ   2x10, 5\" ea  2 x 15, 5\" each                 Seated HS stretch    3 x 30\"  -                 Leg press     55# 2 x 10                 St leg curl      2 x 10                 nustep   L2, 5m  LBE 8m L2                 Neuro Re-Ed                      Mini squats -  2 x 10  2 x 10 modified                 SLB 3 x 15\" 3 x 20\" SLB 3 x 60\"          lunge -            Step up, lat step up -  4\" and 6\" 15 x ea//ecc step down 4\" 15x                       Ther-Act              HEP, pt educ tx plan, activity mods, elevation, self mgmt PC  PC erview                                              Modalities                      CP   CP post tx 10 m R knee                                                  Access Code: [email or web address]  URL: [email or web address]  Date: 05/06/2024  Prepared by: Raisa Lanza    Exercises  - Long Sitting Calf Stretch with Strap  - 2 x daily - 7 x weekly - 1 sets - 3 reps - 30 hold  - Long Sitting Quad Set  - 2 x daily - 7 x weekly - 2 sets - 10 reps  - Sitting Heel Slide with Towel  - 2 x daily - 7 x weekly - 1 sets - 10 reps - 5 hold  - Active Straight Leg Raise with Quad Set  - 2 x daily - 7 x weekly - 1 sets - 10 reps - 2 hold  - Seated Long Arc Quad  - 2 x daily - 7 x weekly - 1 sets - 10 reps - 3 hold  - Heel Raises with Counter Support  - 2 x daily - 7 x weekly - 1 sets - 10 reps - 2 hold  - Single Leg Stance with Support  - 2 x daily - 7 x weekly - 1 sets - 3 reps    Patient Education  - Leg Elevation for Swelling           "

## 2024-05-16 ENCOUNTER — OFFICE VISIT (OUTPATIENT)
Dept: PHYSICAL THERAPY | Facility: CLINIC | Age: 74
End: 2024-05-16
Payer: MEDICARE

## 2024-05-16 DIAGNOSIS — S83.281D TEAR OF LATERAL MENISCUS OF RIGHT KNEE, UNSPECIFIED TEAR TYPE, UNSPECIFIED WHETHER OLD OR CURRENT TEAR, SUBSEQUENT ENCOUNTER: ICD-10-CM

## 2024-05-16 DIAGNOSIS — Z98.890 STATUS POST ARTHROSCOPY OF RIGHT KNEE: ICD-10-CM

## 2024-05-16 DIAGNOSIS — M94.261 CHONDROMALACIA OF RIGHT KNEE: ICD-10-CM

## 2024-05-16 DIAGNOSIS — S83.241D TEAR OF MEDIAL MENISCUS OF RIGHT KNEE, UNSPECIFIED TEAR TYPE, UNSPECIFIED WHETHER OLD OR CURRENT TEAR, SUBSEQUENT ENCOUNTER: Primary | ICD-10-CM

## 2024-05-16 PROCEDURE — 97112 NEUROMUSCULAR REEDUCATION: CPT

## 2024-05-16 PROCEDURE — 97110 THERAPEUTIC EXERCISES: CPT

## 2024-05-16 NOTE — PROGRESS NOTES
"Daily Note     Today's date: 2024  Patient name: Damien Finch  : 1950  MRN: [id number]  Referring provider: Wiliam Mello MD  Dx:   Encounter Diagnosis     ICD-10-CM    1. Tear of medial meniscus of right knee, unspecified tear type, unspecified whether old or current tear, subsequent encounter  S83.241D       2. Tear of lateral meniscus of right knee, unspecified tear type, unspecified whether old or current tear, subsequent encounter  S83.281D       3. Chondromalacia of right knee  M94.261       4. Status post arthroscopy of right knee  Z98.890           Start Time: 1300  Stop Time: 1354  Total time in clinic (min): 54 minutes    Subjective: Patient reports that his knee feels fine.        Objective: See treatment diary below      Assessment: Tolerated treatment well.   Patient participated in skilled PT session focused on strengthening, stretching, and ROM.  Patient able to complete exercise program with no pain.  Patient demonstrates good R knee AROM pain free.  Patient did experience some mild R knee discomfort during step downs.  Patient continues with some mild swelling in R knee.  Patient would continue to benefit from skilled PT interventions to address strengthening, stretching, and ROM. Patient demonstrated fatigue post treatment and exhibited good technique with therapeutic exercises      Plan: Continue per plan of care.      Precautions:  arthroscopy chondro trochlea R knee, med/lat men tears      Daily Treatment Diary:      Initial Evaluation Date: 24  Compliance                Visit Number 1 2  3  4               Re-Eval  IE -                MC   Foto Captured Y -                                         Manual                      PROM R knee -                     STM edema reduc -                     STM scars -                     Ther-Ex                      QS 10x5\"  20x5\"  10x10\"  10\" 10x               ASLR w/QS 10x  2 x 10  2 x 10 " "cues QS  2x10                Heel slides with strap for gentle stretch 10x5\"  20x 5\" Active  20x5\" strap for stretch as ankur  5\" 20x AROM               Self calf stretch with strap 3 x 30\"  3 x 30\"  3 x 30\"  30\" 3x               HR 15x    20x  30x               SAQ/ LAQ   2x10, 5\" ea  2 x 15, 5\" each  2# 5\" 2x15 ea               Seated HS stretch    3 x 30\"  -                 Leg press     55# 2 x 10  55# 2x10               St leg curl      2 x 10  2x10               nustep   L2, 5m  LBE 8m L2  L2  x 8 min               Neuro Re-Ed                      Mini squats -  2 x 10  2 x 10 modified  2x10               SLB 3 x 15\" 3 x 20\" SLB 3 x 60\" SLB 60\" 3x         lunge -            Step up, lat step up -  4\" and 6\" 15 x ea//ecc step down 4\" 15x 6\" step Fwd/Lat x 15 ea    6\" step down 15x                      Ther-Act              HEP, pt educ tx plan, activity mods, elevation, self mgmt PC  PC erview                                              Modalities                      CP   CP post tx 10 m R knee CP post tx 10 min R knee                                                 Access Code: [email or web address]  URL: [email or web address]  Date: 05/06/2024  Prepared by: Raisa Lanza    Exercises  - Long Sitting Calf Stretch with Strap  - 2 x daily - 7 x weekly - 1 sets - 3 reps - 30 hold  - Long Sitting Quad Set  - 2 x daily - 7 x weekly - 2 sets - 10 reps  - Sitting Heel Slide with Towel  - 2 x daily - 7 x weekly - 1 sets - 10 reps - 5 hold  - Active Straight Leg Raise with Quad Set  - 2 x daily - 7 x weekly - 1 sets - 10 reps - 2 hold  - Seated Long Arc Quad  - 2 x daily - 7 x weekly - 1 sets - 10 reps - 3 hold  - Heel Raises with Counter Support  - 2 x daily - 7 x weekly - 1 sets - 10 reps - 2 hold  - Single Leg Stance with Support  - 2 x daily - 7 x weekly - 1 sets - 3 reps    Patient Education  - Leg Elevation for Swelling             "

## 2024-05-20 ENCOUNTER — OFFICE VISIT (OUTPATIENT)
Dept: PHYSICAL THERAPY | Facility: CLINIC | Age: 74
End: 2024-05-20
Payer: MEDICARE

## 2024-05-20 DIAGNOSIS — S83.241D TEAR OF MEDIAL MENISCUS OF RIGHT KNEE, UNSPECIFIED TEAR TYPE, UNSPECIFIED WHETHER OLD OR CURRENT TEAR, SUBSEQUENT ENCOUNTER: Primary | ICD-10-CM

## 2024-05-20 DIAGNOSIS — S83.281D TEAR OF LATERAL MENISCUS OF RIGHT KNEE, UNSPECIFIED TEAR TYPE, UNSPECIFIED WHETHER OLD OR CURRENT TEAR, SUBSEQUENT ENCOUNTER: ICD-10-CM

## 2024-05-20 DIAGNOSIS — M94.261 CHONDROMALACIA OF RIGHT KNEE: ICD-10-CM

## 2024-05-20 DIAGNOSIS — Z98.890 STATUS POST ARTHROSCOPY OF RIGHT KNEE: ICD-10-CM

## 2024-05-20 PROCEDURE — 97112 NEUROMUSCULAR REEDUCATION: CPT | Performed by: PHYSICAL THERAPIST

## 2024-05-20 PROCEDURE — 97110 THERAPEUTIC EXERCISES: CPT | Performed by: PHYSICAL THERAPIST

## 2024-05-20 NOTE — PROGRESS NOTES
"Daily Note     Today's date: 2024  Patient name: Damien Finch  : 1950  MRN: [id number]  Referring provider: Wiliam Mello MD  Dx:   Encounter Diagnosis     ICD-10-CM    1. Tear of medial meniscus of right knee, unspecified tear type, unspecified whether old or current tear, subsequent encounter  S83.241D       2. Tear of lateral meniscus of right knee, unspecified tear type, unspecified whether old or current tear, subsequent encounter  S83.281D       3. Chondromalacia of right knee  M94.261       4. Status post arthroscopy of right knee  Z98.890                      Subjective: Pt reports R Knee is feeling less frequent and less intense pain sx. He notes walking up stairs and prolonged weight bearing activity of walking or standing provoke pain in ant. Lateral knee.       Objective: See treatment diary below.Pt notes pain at worst 2-3/10 R ant inferior knee. He notes no pain with flexion achieving 128 degrees but does note pain 2-3/10 with end range TKE in [place] and [facility]. Tx modified to avoid the sx. With step ex, mini squats and Leg press. Swelling is very mild in inferior knee at arthroscopy sites with no signs of infection present.       Assessment: Tolerated treatment well. Patient would benefit from continued PT to achieve goals of care.      Plan: Continue per plan of care.      Precautions:  arthroscopy chondro trochlea R knee, med/lat men tears      Daily Treatment Diary:      Initial Evaluation Date: 24  Compliance              Visit Number 1 2  3  4  5             Re-Eval  [facility]   Foto Captured Y -                                       Manual                      PROM R knee -                     STM edema reduc -                     STM scars -        nv to begin             Ther-Ex                      QS 10x5\"  20x5\"  10x10\"  10\" 10x  10\"x10             ASLR w/QS 10x  2 x 10  2 x 10 cues QS  2x10 , QS  2x10,  2# " "            Heel slides with strap for gentle stretch 10x5\"  20x 5\" Active  20x5\" strap for stretch as ankur  5\" 20x AROM  5\" 20x AROM             Self calf stretch with strap 3 x 30\"  3 x 30\"  3 x 30\"  30\" 3x  30\"x 3             HR 15x    20x  30x  30x             SAQ/ LAQ   2x10, 5\" ea  2 x 15, 5\" each  2# 5\" 2x15 ea  3# 5\" 2 x 15 ea             Seated HS stretch    3 x 30\"  -                 Leg press     55# 2 x 10  55# 2x10  65# 2 x 10             St leg curl      2 x 10  2x10  2 x 10, 3#             nustep   L2, 5m  LBE 8m L2  L2  x 8 min  bike  L2 8m             Neuro Re-Ed                      Mini squats -  2 x 10  2 x 10 modified  2x10  2x10             SLB 3 x 15\" 3 x 20\" SLB 3 x 60\" SLB 60\" 3x SLB 60\" x 1 floor, 10\" x 5 on Air Ex        lunge -            Step up, lat step up -  4\" and 6\" 15 x ea//ecc step down 4\" 15x 6\" step Fwd/Lat x 15 ea    6\" step down 15x 6\" step fwd.lat x 15 ea, 2 sets, 6\" step down 15x                     Ther-Act              HEP, pt educ tx plan, activity mods, elevation, self mgmt PC  PC erview      PC review of activity mods and HEP                                        Modalities                      CP   CP post tx 10 m R knee CP post tx 10 min R knee Decl. CP to do at home                                                Access Code: [email or web address]  URL: [email or web address]  Date: 05/06/2024  Prepared by: Raisa Lanza    Exercises  - Long Sitting Calf Stretch with Strap  - 2 x daily - 7 x weekly - 1 sets - 3 reps - 30 hold  - Long Sitting Quad Set  - 2 x daily - 7 x weekly - 2 sets - 10 reps  - Sitting Heel Slide with Towel  - 2 x daily - 7 x weekly - 1 sets - 10 reps - 5 hold  - Active Straight Leg Raise with Quad Set  - 2 x daily - 7 x weekly - 1 sets - 10 reps - 2 hold  - Seated Long Arc Quad  - 2 x daily - 7 x weekly - 1 sets - 10 reps - 3 hold  - Heel Raises with Counter Support  - 2 x daily - 7 x weekly - 1 sets - 10 reps - 2 hold  - Single Leg Stance with " Support  - 2 x daily - 7 x weekly - 1 sets - 3 reps    Patient Education  - Leg Elevation for Swelling

## 2024-05-23 ENCOUNTER — OFFICE VISIT (OUTPATIENT)
Dept: PHYSICAL THERAPY | Facility: CLINIC | Age: 74
End: 2024-05-23
Payer: MEDICARE

## 2024-05-23 DIAGNOSIS — Z98.890 STATUS POST ARTHROSCOPY OF RIGHT KNEE: ICD-10-CM

## 2024-05-23 DIAGNOSIS — S83.241D TEAR OF MEDIAL MENISCUS OF RIGHT KNEE, UNSPECIFIED TEAR TYPE, UNSPECIFIED WHETHER OLD OR CURRENT TEAR, SUBSEQUENT ENCOUNTER: Primary | ICD-10-CM

## 2024-05-23 DIAGNOSIS — S83.281D TEAR OF LATERAL MENISCUS OF RIGHT KNEE, UNSPECIFIED TEAR TYPE, UNSPECIFIED WHETHER OLD OR CURRENT TEAR, SUBSEQUENT ENCOUNTER: ICD-10-CM

## 2024-05-23 DIAGNOSIS — M94.261 CHONDROMALACIA OF RIGHT KNEE: ICD-10-CM

## 2024-05-23 PROCEDURE — 97112 NEUROMUSCULAR REEDUCATION: CPT | Performed by: PHYSICAL THERAPIST

## 2024-05-23 PROCEDURE — 97110 THERAPEUTIC EXERCISES: CPT | Performed by: PHYSICAL THERAPIST

## 2024-05-23 PROCEDURE — 97140 MANUAL THERAPY 1/> REGIONS: CPT | Performed by: PHYSICAL THERAPIST

## 2024-05-23 NOTE — PROGRESS NOTES
"Daily Note     Today's date: 2024  Patient name: Damien Finch  : 1950  MRN: [id number]  Referring provider: Wiliam Mello MD  Dx:   Encounter Diagnosis     ICD-10-CM    1. Tear of medial meniscus of right knee, unspecified tear type, unspecified whether old or current tear, subsequent encounter  S83.241D       2. Tear of lateral meniscus of right knee, unspecified tear type, unspecified whether old or current tear, subsequent encounter  S83.281D       3. Chondromalacia of right knee  M94.261       4. Status post arthroscopy of right knee  Z98.890                      Subjective: Pt notes knee is doing well today. Intermittent pain rated 2-3/10 at worst lateral patellar aspect- occurring with pivoting on planted foot.       Objective: See treatment diary below. Initiated STM to incisonal scars consisting of gentle pressure for scar modeling and educated pt in self M which he was able to demonstrate with good return. No swelling in R knee today.  Progressed with quad/hip flexor stretch supine EOT w/strap with good tolerance. Demonstrates dec quad strength with closed chain squats and fatigue with  tasks.       Assessment: Tolerated treatment well. Patient would benefit from continued PT to achieve goals outlined.       Plan: Continue per plan of care.      Precautions:  arthroscopy chondro trochlea R knee, med/lat men tears      Daily Treatment Diary:      Initial Evaluation Date: 24  Compliance            Visit Number 1 2  3  4  5  6           Re-Eval  IE -                MC   Foto Captured Y -          needs foto                           Manual                      PROM R knee -                     STM edema reduc -                     STM scars -        nv to begin  PC           Ther-Ex                      QS 10x5\"  20x5\"  10x10\"  10\" 10x  10\"x10  10x10\"           ASLR w/QS 10x  2 x 10  2 x 10 cues QS  2x10 , QS  2x10,  2#  " "2x10 2#           Heel slides with strap for gentle stretch 10x5\"  20x 5\" Active  20x5\" strap for stretch as ankur  5\" 20x AROM  5\" 20x AROM  5\" x 20 AROM           Self calf stretch with strap 3 x 30\"  3 x 30\"  3 x 30\"  30\" 3x  30\"x 3  30x3\", hip flex/quad EOT w/strap 3 x 30\"           HR 15x    20x  30x  30x  30x WBAT RLE// st calf stretch 3 x 20\"           SAQ/ LAQ   2x10, 5\" ea  2 x 15, 5\" each  2# 5\" 2x15 ea  3# 5\" 2 x 15 ea  5# 2 x 10 ea           Seated HS stretch    3 x 30\"  -                 Leg press     55# 2 x 10  55# 2x10  65# 2 x 10  65# 3 x 10           St leg curl      2 x 10  2x10  2 x 10, 3#  2 x 10 3#           nustep   L2, 5m  LBE 8m L2  L2  x 8 min  bike  L2 8m  bike L2 8m           Neuro Re-Ed                      Mini squats -  2 x 10  2 x 10 modified  2x10  2x10  3 x 10           SLB 3 x 15\" 3 x 20\" SLB 3 x 60\" SLB 60\" 3x SLB 60\" x 1 floor, 10\" x 5 on Air Ex SLB 60\" x 1 floor, 10\" x 5 on Air Ex       lunge -            Step up, lat step up -  4\" and 6\" 15 x ea//ecc step down 4\" 15x 6\" step Fwd/Lat x 15 ea    6\" step down 15x 6\" step Fwd/Lat x 15 ea    6\" step down 15x 6\" step Fwd/Lat x 15 ea    6\" step down 15x                    Ther-Act              HEP, pt educ tx plan, activity mods, elevation, self mgmt PC  PC erview      PC review of activity mods and HEP  PC self  massage to scar                                      Modalities                      CP   CP post tx 10 m R knee CP post tx 10 min R knee Decl. CP to do at home CP post 10 m                                               Access Code: [email or web address]  URL: [email or web address]  Date: 05/06/2024  Prepared by: Raisa Lanza    Exercises  - Long Sitting Calf Stretch with Strap  - 2 x daily - 7 x weekly - 1 sets - 3 reps - 30 hold  - Long Sitting Quad Set  - 2 x daily - 7 x weekly - 2 sets - 10 reps  - Sitting Heel Slide with Towel  - 2 x daily - 7 x weekly - 1 sets - 10 reps - 5 hold  - Active Straight Leg Raise with Quad Set "  - 2 x daily - 7 x weekly - 1 sets - 10 reps - 2 hold  - Seated Long Arc Quad  - 2 x daily - 7 x weekly - 1 sets - 10 reps - 3 hold  - Heel Raises with Counter Support  - 2 x daily - 7 x weekly - 1 sets - 10 reps - 2 hold  - Single Leg Stance with Support  - 2 x daily - 7 x weekly - 1 sets - 3 reps    Patient Education  - Leg Elevation for Swelling

## 2024-05-28 ENCOUNTER — OFFICE VISIT (OUTPATIENT)
Dept: PHYSICAL THERAPY | Facility: CLINIC | Age: 74
End: 2024-05-28
Payer: MEDICARE

## 2024-05-28 DIAGNOSIS — M94.261 CHONDROMALACIA OF RIGHT KNEE: ICD-10-CM

## 2024-05-28 DIAGNOSIS — S83.281D TEAR OF LATERAL MENISCUS OF RIGHT KNEE, UNSPECIFIED TEAR TYPE, UNSPECIFIED WHETHER OLD OR CURRENT TEAR, SUBSEQUENT ENCOUNTER: ICD-10-CM

## 2024-05-28 DIAGNOSIS — S83.241D TEAR OF MEDIAL MENISCUS OF RIGHT KNEE, UNSPECIFIED TEAR TYPE, UNSPECIFIED WHETHER OLD OR CURRENT TEAR, SUBSEQUENT ENCOUNTER: Primary | ICD-10-CM

## 2024-05-28 DIAGNOSIS — Z98.890 STATUS POST ARTHROSCOPY OF RIGHT KNEE: ICD-10-CM

## 2024-05-28 PROCEDURE — 97110 THERAPEUTIC EXERCISES: CPT | Performed by: PHYSICAL THERAPIST

## 2024-05-28 PROCEDURE — 97112 NEUROMUSCULAR REEDUCATION: CPT | Performed by: PHYSICAL THERAPIST

## 2024-05-28 NOTE — PROGRESS NOTES
"Daily Note     Today's date: 2024  Patient name: Damien Finch  : 1950  MRN: [id number]  Referring provider: Wiliam Mello MD  Dx:   Encounter Diagnosis     ICD-10-CM    1. Tear of medial meniscus of right knee, unspecified tear type, unspecified whether old or current tear, subsequent encounter  S83.241D       2. Tear of lateral meniscus of right knee, unspecified tear type, unspecified whether old or current tear, subsequent encounter  S83.281D       3. Chondromalacia of right knee  M94.261       4. Status post arthroscopy of right knee  Z98.890                      Subjective: Pt reports his right knee is doing well with some pain if pivots on planted foot but otherwise is just tight sometimes. Denies any swelling. Reports 100% better than prior to surgery.       Objective: See treatment diary below      Assessment: Tolerated treatment well.Pt amb. With good gait pattern. Notes no pain with stair amb.  Right knee ROM WNLS for flexion and extension. No visible swelling in R knee. Tenderness reported with palpation of R lateral knee scope site addressed with gentle STM. Patient would benefit from continued PT to progress to d/c to HEP next visit.       Plan: Potential discharge next visit.     Precautions:  arthroscopy chondro trochlea R knee, med/lat men tears      Daily Treatment Diary:      Initial Evaluation Date: 24  Compliance          Visit Number 1 2  3  4  5  6  7         Re-Eval  IE -                MC   Foto Captured Y -         Y                         Manual                      PROM R knee -                     STM edema reduc -                     STM scars -        nv to begin  PC  PC         Ther-Ex                      QS 10x5\"  20x5\"  10x10\"  10\" 10x  10\"x10  10x10\"  10x10\"         ASLR w/QS 10x  2 x 10  2 x 10 cues QS  2x10 , QS  2x10,  2#  2x10 2#  2x10 3#         Heel slides with strap for " "gentle stretch 10x5\"  20x 5\" Active  20x5\" strap for stretch as ankur  5\" 20x AROM  5\" 20x AROM  5\" x 20 AROM  5\"x20 AROM         Self calf stretch with strap 3 x 30\"  3 x 30\"  3 x 30\"  30\" 3x  30\"x 3  30x3\", hip flex/quad EOT w/strap 3 x 30\"  30x3\", hip flex/quad EOT w/strap 3 x 30\"         HR 15x    20x  30x  30x  30x WBAT RLE// st calf stretch 3 x 20\"   30x WBAT RLE// st calf stretch 3 x 20\"         SAQ/ LAQ   2x10, 5\" ea  2 x 15, 5\" each  2# 5\" 2x15 ea  3# 5\" 2 x 15 ea  5# 2 x 10 ea  5# 2 x 10 ea         Seated HS stretch    3 x 30\"  -                 Leg press     55# 2 x 10  55# 2x10  65# 2 x 10  65# 3 x 10 75# 3 x 10         St leg curl      2 x 10  2x10  2 x 10, 3#  2 x 10 3#  2x10, 3#         nustep   L2, 5m  LBE 8m L2  L2  x 8 min  bike  L2 8m  bike L2 8m  bike L2 10 m         Neuro Re-Ed                      Mini squats -  2 x 10  2 x 10 modified  2x10  2x10  3 x 10  3x10         SLB 3 x 15\" 3 x 20\" SLB 3 x 60\" SLB 60\" 3x SLB 60\" x 1 floor, 10\" x 5 on Air Ex SLB 60\" x 1 floor, 10\" x 5 on Air Ex SLB 60\" x 1 floor, 10\" x 5 on Air Ex      lunge -            Step up, lat step up -  4\" and 6\" 15 x ea//ecc step down 4\" 15x 6\" step Fwd/Lat x 15 ea    6\" step down 15x 6\" step Fwd/Lat x 15 ea    6\" step down 15x 6\" step Fwd/Lat x 15 ea    6\" step down 15x 6\" step Fwd/Lat x 20 ea    6\" step down 20x                   Ther-Act              HEP, pt educ tx plan, activity mods, elevation, self mgmt PC  PC erview      PC review of activity mods and HEP  PC self  massage to scar  review final slef mgmts and HEP                                    Modalities                      CP Deferred to do at home  CP post tx 10 m R knee CP post tx 10 min R knee Decl. CP to do at home CP post 10 m -                                              Access Code: [email or web address]  URL: [email or web address]  Date: 05/06/2024  Prepared by: Raisa Lanza    Exercises  - Long Sitting Calf Stretch with Strap  - 2 x daily - 7 x weekly - 1 " sets - 3 reps - 30 hold  - Long Sitting Quad Set  - 2 x daily - 7 x weekly - 2 sets - 10 reps  - Sitting Heel Slide with Towel  - 2 x daily - 7 x weekly - 1 sets - 10 reps - 5 hold  - Active Straight Leg Raise with Quad Set  - 2 x daily - 7 x weekly - 1 sets - 10 reps - 2 hold  - Seated Long Arc Quad  - 2 x daily - 7 x weekly - 1 sets - 10 reps - 3 hold  - Heel Raises with Counter Support  - 2 x daily - 7 x weekly - 1 sets - 10 reps - 2 hold  - Single Leg Stance with Support  - 2 x daily - 7 x weekly - 1 sets - 3 reps    Patient Education  - Leg Elevation for Swelling

## 2024-05-31 ENCOUNTER — OFFICE VISIT (OUTPATIENT)
Dept: PHYSICAL THERAPY | Facility: CLINIC | Age: 74
End: 2024-05-31
Payer: MEDICARE

## 2024-05-31 DIAGNOSIS — Z98.890 STATUS POST ARTHROSCOPY OF RIGHT KNEE: ICD-10-CM

## 2024-05-31 DIAGNOSIS — S83.241D TEAR OF MEDIAL MENISCUS OF RIGHT KNEE, UNSPECIFIED TEAR TYPE, UNSPECIFIED WHETHER OLD OR CURRENT TEAR, SUBSEQUENT ENCOUNTER: Primary | ICD-10-CM

## 2024-05-31 DIAGNOSIS — M94.261 CHONDROMALACIA OF RIGHT KNEE: ICD-10-CM

## 2024-05-31 DIAGNOSIS — S83.281D TEAR OF LATERAL MENISCUS OF RIGHT KNEE, UNSPECIFIED TEAR TYPE, UNSPECIFIED WHETHER OLD OR CURRENT TEAR, SUBSEQUENT ENCOUNTER: ICD-10-CM

## 2024-05-31 PROCEDURE — 97112 NEUROMUSCULAR REEDUCATION: CPT | Performed by: PHYSICAL THERAPIST

## 2024-05-31 PROCEDURE — 97110 THERAPEUTIC EXERCISES: CPT | Performed by: PHYSICAL THERAPIST

## 2024-05-31 NOTE — PROGRESS NOTES
"Daily Note/Discharge     Today's date: 2024  Patient name: Damien Finch  : 1950  MRN: [id number]  Referring provider: Wiliam Mello MD  Dx:   Encounter Diagnosis     ICD-10-CM    1. Tear of medial meniscus of right knee, unspecified tear type, unspecified whether old or current tear, subsequent encounter  S83.241D       2. Tear of lateral meniscus of right knee, unspecified tear type, unspecified whether old or current tear, subsequent encounter  S83.281D       3. Chondromalacia of right knee  M94.261       4. Status post arthroscopy of right knee  Z98.890                      Subjective: Pt sate he is pleased with status of R knee and feels ready to d/c to HEP as his goals of care are achieved.       Objective: See treatment diary below. R knee is without swelling this date. Pt denies any swelling in R knee  even toward end of day and with increase in WB function.  R knee AROM  0-137deg with tightness in R lateral knee with end range flexion today, no pain. Strength of R Knee flex  and ext 5/5. Pt able to step up onto 6\" with 5# db in each UE 20x with good form and control of R Knee. Pt completed SLS on Air Ex R knee with finger tip A intermittently with good dynamic form demonstrated. Pt able to demonstrate good return with final HEP. Pt educated in gradual return to WB activity and to complete intermittent NWB ex of LBE, pool exercises for long term managements of knees.  Pt encouraged to acquire variable cuff weights for completion of HEP strength ex.       Assessment: Tolerated treatment well. Patient  has achieved goals of care.  Pt at appropriate levels for d/c to HEP at this time.       Plan: Pt has achieved goals of care and is at appropriate levels to discharge to HEP at this time.  Pt does follow with Dr. Mello next week.      Precautions:  arthroscopy chondro trochlea R knee, med/lat men tears      Daily Treatment Diary:      Initial Evaluation Date: 24  Compliance   " "5/14 5/16 5/20 5/23 5/28 5/31       Visit Number 1 2  3  4  5  6  7  8       Re-Eval  IE -                MC   Foto Captured Y -         Y  Y              5/6 5/9 5/14 5/16 5/20 5/23 5/28 5/31       Manual                      PROM R knee -                     STM edema reduc -                     STM scars -        nv to begin  PC  PC  PC       Ther-Ex                      QS 10x5\"  20x5\"  10x10\"  10\" 10x  10\"x10  10x10\"  10x10\"  10x10\"       ASLR w/QS 10x  2 x 10  2 x 10 cues QS  2x10 , QS  2x10,  2#  2x10 2#  2x10 3#  2x10 3#       Heel slides with strap for gentle stretch 10x5\"  20x 5\" Active  20x5\" strap for stretch as ankur  5\" 20x AROM  5\" 20x AROM  5\" x 20 AROM  5\"x20 AROM  5\"x20 AROM       Self calf stretch with strap 3 x 30\"  3 x 30\"  3 x 30\"  30\" 3x  30\"x 3  30x3\", hip flex/quad EOT w/strap 3 x 30\"  30x3\", hip flex/quad EOT w/strap 3 x 30\" - st calf stretch 3 x 30\"       HR 15x    20x  30x  30x  30x WBAT RLE// st calf stretch 3 x 20\"   30x WBAT RLE// st calf stretch 3 x 20\"  30x WBAT RLE       SAQ/ LAQ   2x10, 5\" ea  2 x 15, 5\" each  2# 5\" 2x15 ea  3# 5\" 2 x 15 ea  5# 2 x 10 ea  5# 2 x 10 ea  5# 2 x 15 ea       Seated HS stretch    3 x 30\"  -                 Leg press     55# 2 x 10  55# 2x10  65# 2 x 10  65# 3 x 10 75# 3 x 10  85# 3 x 10 B       St leg curl      2 x 10  2x10  2 x 10, 3#  2 x 10 3#  2x10, 3#  2x10 , 3#       nustep   L2, 5m  LBE 8m L2  L2  x 8 min  bike  L2 8m  bike L2 8m  bike L2 10 m  bike L2 10 m       Neuro Re-Ed                      Mini squats -  2 x 10  2 x 10 modified  2x10  2x10  3 x 10  3x10  3 x 10  LE only on Air Ex finger tip A       SLB 3 x 15\" 3 x 20\" SLB 3 x 60\" SLB 60\" 3x SLB 60\" x 1 floor, 10\" x 5 on Air Ex SLB 60\" x 1 floor, 10\" x 5 on Air Ex SLB 60\" x 1 floor, 10\" x 5 on Air Ex SLB 60\" floor, 4 dir static stabs BUE pulls 20x     lunge -            Step up, lat step up -  4\" and 6\" 15 x ea//ecc step down 4\" 15x 6\" step Fwd/Lat x 15 ea    6\" step down 15x 6\" " "step Fwd/Lat x 15 ea    6\" step down 15x 6\" step Fwd/Lat x 15 ea    6\" step down 15x 6\" step Fwd/Lat x 20 ea    6\" step down 20x 6\" step F/L 5# db each UE                  Ther-Act              HEP, pt educ tx plan, activity mods, elevation, self mgmt PC  PC erview      PC review of activity mods and HEP  PC self  massage to scar  review final slef mgmts and HEP  review of final HEP, strategies of joint conservation, reduc of recurrnce                                  Modalities                      CP Deferred to do at home  CP post tx 10 m R knee CP post tx 10 min R knee Decl. CP to do at home CP post 10 m - -                                             Access Code: [email or web address]  URL: [email or web address]  Date: 05/06/2024  Prepared by: Raisa Lanza    Exercises  - Long Sitting Calf Stretch with Strap  - 2 x daily - 7 x weekly - 1 sets - 3 reps - 30 hold  - Long Sitting Quad Set  - 2 x daily - 7 x weekly - 2 sets - 10 reps  - Sitting Heel Slide with Towel  - 2 x daily - 7 x weekly - 1 sets - 10 reps - 5 hold  - Active Straight Leg Raise with Quad Set  - 2 x daily - 7 x weekly - 1 sets - 10 reps - 2 hold  - Seated Long Arc Quad  - 2 x daily - 7 x weekly - 1 sets - 10 reps - 3 hold  - Heel Raises with Counter Support  - 2 x daily - 7 x weekly - 1 sets - 10 reps - 2 hold  - Single Leg Stance with Support  - 2 x daily - 7 x weekly - 1 sets - 3 reps    Patient Education  - Leg Elevation for Swelling                     "

## 2024-06-20 ENCOUNTER — EVALUATION (OUTPATIENT)
Dept: PHYSICAL THERAPY | Facility: CLINIC | Age: 74
End: 2024-06-20
Payer: MEDICARE

## 2024-06-20 VITALS — DIASTOLIC BLOOD PRESSURE: 68 MMHG | SYSTOLIC BLOOD PRESSURE: 130 MMHG | OXYGEN SATURATION: 98 % | HEART RATE: 52 BPM

## 2024-06-20 DIAGNOSIS — S83.242D TEAR OF MEDIAL MENISCUS OF LEFT KNEE, UNSPECIFIED TEAR TYPE, UNSPECIFIED WHETHER OLD OR CURRENT TEAR, SUBSEQUENT ENCOUNTER: Primary | ICD-10-CM

## 2024-06-20 DIAGNOSIS — Z98.890 S/P ARTHROSCOPY OF LEFT KNEE: ICD-10-CM

## 2024-06-20 PROCEDURE — 97110 THERAPEUTIC EXERCISES: CPT | Performed by: PHYSICAL THERAPIST

## 2024-06-20 PROCEDURE — 97530 THERAPEUTIC ACTIVITIES: CPT | Performed by: PHYSICAL THERAPIST

## 2024-06-20 NOTE — LETTER
2024    Wiliam Mello MD  [address] [place] [id number]    Patient: Damien Finch   YOB: 1950   Date of Visit: 2024     Encounter Diagnosis     ICD-10-CM    1. Tear of medial meniscus of left knee, unspecified tear type, unspecified whether old or current tear, subsequent encounter  S83.242D       2. S/P arthroscopy of left knee  Z98.890           Dear Dr. Mello:    Thank you for your recent referral of Damien Finch. Please review the attached evaluation summary from Damien's recent visit.     Please verify that you agree with the plan of care by signing the attached order.     If you have any questions or concerns, please do not hesitate to call.     I sincerely appreciate the opportunity to share in the care of one of your patients and hope to have another opportunity to work with you in the near future.       Sincerely,    Raisa Lanza, PT      Referring Provider:      I certify that I have read the below Plan of Care and certify the need for these services furnished under this plan of treatment while under my care.                    Wiliam Mello MD  [address] [place] [id number]  Via Fax: [phone]          PT Re-Evaluation     Today's date: 2024  Patient name: Damien Finch  : 1950  MRN: [id number]  Referring provider: Wiliam Mello MD  Dx:   Encounter Diagnosis     ICD-10-CM    1. Tear of medial meniscus of left knee, unspecified tear type, unspecified whether old or current tear, subsequent encounter  S83.242D       2. S/P arthroscopy of left knee  Z98.890                        Assessment  Impairments: abnormal or restricted ROM, impaired physical strength, lacks appropriate home exercise program and weight-bearing intolerance  Other impairment: pain with attempts at SLB On LLE  ant. inf knee reported, unable t o complete  Symptom irritability: moderate    Assessment details: 73 yo male s/p R Knee arthroscopy  for  partial medial and partial lateral meniscal tears, chrondroplasty of trochlea, removal of loose bodies and cyst following knee pain and limited functional weight bearing tolerances and injection series. Pt today presents with impairments of ROM, strength, single limb balance, tolerance for WB function levels,  down grades and stairs, swelling and pain.  Pt is excellent candidate for PT to achieve goals of care. Pt educated in healing and activity modification to allow for healing s/p arthroscopy.   Update 6/20/24 - 73 yo male known  to me from prior admission of care for R Knee post op rehabilitation earlier this spring,  now returning for care for L knee s/p arthroscopic debridement of L knee meniscal tear, presenting as per dx with deficits of strength, ROM, swelling , pain , decreased WB tolerances, gait dysfunction.   Pt tolerance for amb., ADL, recreation, yard and  homekeeping impacted by his knee status. Pt is anticiapted to respond well to outlined care ploan to achieve goals.   Barriers to therapy: None known  Understanding of Dx/Px/POC: excellent     Prognosis: excellent    Goals  Goals  STG   1-2 weeks:  Patient to develop L knee AROM 0- 125-130 degrees   Patient to develop L knee strength 4+/5   Decrease swelling by 1 cm at knee joint level  Patient to develop independence with HEP to assist with goal achievements.    LTG  2-4 weeks  Patient to develop L knee ROM 0-130 degrees to return to amb long distances with out difficulty   Patient to develop L knee Strength 4+/5 to 5/5 to allow for return to ambulation on stairs with reciprocal gait with no difficulty  Patient to develop L knee  ROM and strength to allow for return to homekeeping  and yard work with no to little difficulty  Patient to develop L knee  ROM and strength to return to driving with no difficulty as appropriate   Patient to develop strength 4+/5 to 5/5 L knee to amb down grades with out difficulty      Plan  Patient would benefit from:  "skilled physical therapy  Planned modality interventions: cryotherapy and unattended electrical stimulation    Planned therapy interventions: manual therapy, neuromuscular re-education, patient education, functional ROM exercises, graded exercise, home exercise program, therapeutic exercise, therapeutic activities, strengthening and stretching    Frequency: 2x week  Duration in weeks: 4  Plan of Care beginning date: 6/20/2024  Plan of Care expiration date: 7/18/2024  Treatment plan discussed with: patient        Subjective Evaluation    History of Present Illness  Mechanism of injury: surgery  Mechanism of injury: Pt notes fall 2 years ago  and injury to R knee but had been having injections in B knees secondary to some pain.  He notes R knee tx with injection series but did not help. He notes worsening of pain and progressive decrease in weight bearing tolerances limited recreation, walking and standing. He noted mild swelling pre operatively and pain and limitations with bending knee  with painful crack or \"creaking\" with bending knee.  He  underwent arthroscopic debridement 5/2/24 by Dr. Mello  in [place]. He noted good tolerance to surgery.  He noted PMH of  prostrate and urethra difficulty, denies any allergies. He notes medications of  81 asa, pain medication at night for knee, tyelnol arthritis, antiinflammatory meds name unknown.  Hx of Stomach surgery - laproscopy  years ago. .     Update 6/20/24- Pt returns following surgery to L knee  with dx of PLM with surgery on  6/18/24 with good tolerance.  He notes using CP and eleveation many times per day. He is not using an assistive device and has pain with amb.  He notes having  alimp secondary to the pain. Pt  notes some thigh muscle cramps last night and today. Meds  Tremadol, meloxicam, asa   since surgery in addition to regula rmeds reviewed with pt in chart,  notes no changes in medical dx or other medication, no change in allergies. Returns for follow " 24.   Quality of life: good    Patient Goals  Patient goals for therapy: return to sport/leisure activities, decreased pain and decreased edema  Patient goal: to return to walking for hunting, to return to full weight bearing function, to amb down staira dn grades without diffiuclty  Pain  Current pain rating: 3  At best pain ratin  At worst pain ratin  Location: ant aspect of L knee  Quality: discomfort and sharp  Relieving factors: ice and medications  Aggravating factors: walking, standing and stair climbing  Progression: improved    Social Support  1  Lives in: multiple-level home  Lives with: spouse    Employment status: not working  Hand dominance: right          Objective     Observations   Left Knee   Positive for edema.     Additional Observation Details  Post op arthroscopy sites infrapatellar region L knee intact with steristrips with no redness or signs of infection present    Palpation     Right   No palpable tenderness to the distal biceps femoris, distal semimembranosus, distal semitendinosus, lateral gastrocnemius, medial gastrocnemius, rectus femoris, vastus lateralis and vastus medialis.     Neurological Testing     Sensation     Knee   Left Knee   Intact: Light touch    Right Knee   Intact: light touch     Active Range of Motion   Left Knee   Flexion: 120 degrees   Extension: 15 degrees     Right Knee   Flexion: 130 degrees   Extension: 5 degrees     Patellar Static Positioning   Left Knee: WFL  Right Knee: WFL    Strength/Myotome Testing     Left Knee   Flexion: 4  Extension: 4  Quadriceps contraction: good    Right Knee   Normal strength  Flexion: 4  Extension: 4  Quadriceps contraction: good    Additional Strength Details  L LE Ankle df  4+/5, pf 4+/5  Hip extension 4+/5, abduction 4+/5 RLE    Swelling     Left Knee Girth Measurement (cm)   Joint line: 38 cm  10 cm above joint line: 41 cm  10 cm below joint line: 34.7 cm    Right Knee Girth Measurement (cm)   Joint line: 36.7 cm  10  "cm above joint line: 41.3 cm  10 cm below joint line: 35 cm    Ambulation     Observational Gait   Gait: antalgic   Decreased walking speed, stride length, left stance time and left step length.   Left foot contact pattern: foot flat    Quality of Movement During Gait   Trunk  Forward lean.     Knee    Knee (Left): Positive stiff knee.     General Comments:      Knee Comments  Demonstrates L knee swelling in suprapatellar pouch and superiolateral knee mild to mod in amount             Precautions:  arthroscopy chondro trochlea R knee, med/lat men tears      Daily Treatment Diary:      Initial Evaluation Date: 06/20/24  Compliance 5/6                     Visit Number 1                    Re-Eval  IE                 MC   Foto Captured Y                           5/6                     Manual                      PROM R knee -                     STM edema reduc -                     STM scars -                     Ther-Ex                      QS 10x5\"                     ASLR w/QS 10x                     Heel slides with strap for gentle stretch 10x5\"                     Self calf stretch with strap 3 x 30\"                     HR 15x                     Hip 3 ways st 15 x ea LLE                                                                                                             Neuro Re-Ed                      Mini squats -                     SLB 3 x 15\"            lunge -            Step up, lat step up -                         Ther-Act              HEP, pt educ tx plan, activity mods, elevation, self mgmt PC                                                Modalities                      CP 10 m L knee post tx                                                    Access Code: [email or web address]  URL: [email or web address]  Date: 05/06/2024  Prepared by: Raisa Lanza    Exercises  - Long Sitting Calf Stretch with Strap  - 2 x daily - 7 x weekly - 1 sets - 3 reps - 30 hold  - Long Sitting Quad Set  - 2 x " daily - 7 x weekly - 2 sets - 10 reps  - Sitting Heel Slide with Towel  - 2 x daily - 7 x weekly - 1 sets - 10 reps - 5 hold  - Active Straight Leg Raise with Quad Set  - 2 x daily - 7 x weekly - 1 sets - 10 reps - 2 hold  - Seated Long Arc Quad  - 2 x daily - 7 x weekly - 1 sets - 10 reps - 3 hold  - Heel Raises with Counter Support  - 2 x daily - 7 x weekly - 1 sets - 10 reps - 2 hold  - Single Leg Stance with Support  - 2 x daily - 7 x weekly - 1 sets - 3 reps    Patient Education  - Leg Elevation for Swelling

## 2024-06-20 NOTE — PROGRESS NOTES
PT Re-Evaluation     Today's date: 2024  Patient name: Damien Finch  : 1950  MRN: [id number]  Referring provider: Wiliam Mello MD  Dx:   Encounter Diagnosis     ICD-10-CM    1. Tear of medial meniscus of left knee, unspecified tear type, unspecified whether old or current tear, subsequent encounter  S83.242D       2. S/P arthroscopy of left knee  Z98.890                        Assessment  Impairments: abnormal or restricted ROM, impaired physical strength, lacks appropriate home exercise program and weight-bearing intolerance  Other impairment: pain with attempts at SLB On LLE  ant. inf knee reported, unable t o complete  Symptom irritability: moderate    Assessment details: 73 yo male s/p R Knee arthroscopy  for partial medial and partial lateral meniscal tears, chrondroplasty of trochlea, removal of loose bodies and cyst following knee pain and limited functional weight bearing tolerances and injection series. Pt today presents with impairments of ROM, strength, single limb balance, tolerance for WB function levels,  down grades and stairs, swelling and pain.  Pt is excellent candidate for PT to achieve goals of care. Pt educated in healing and activity modification to allow for healing s/p arthroscopy.   Update 24 - 73 yo male known  to me from prior admission of care for R Knee post op rehabilitation earlier this spring,  now returning for care for L knee s/p arthroscopic debridement of L knee meniscal tear, presenting as per dx with deficits of strength, ROM, swelling , pain , decreased WB tolerances, gait dysfunction.   Pt tolerance for amb., ADL, recreation, yard and  homekeeping impacted by his knee status. Pt is anticiapted to respond well to outlined care ploan to achieve goals.   Barriers to therapy: None known  Understanding of Dx/Px/POC: excellent     Prognosis: excellent    Goals  Goals  STG   1-2 weeks:  Patient to develop L knee AROM 0- 125-130 degrees   Patient to  "develop L knee strength 4+/5   Decrease swelling by 1 cm at knee joint level  Patient to develop independence with HEP to assist with goal achievements.    LTG  2-4 weeks  Patient to develop L knee ROM 0-130 degrees to return to amb long distances with out difficulty   Patient to develop L knee Strength 4+/5 to 5/5 to allow for return to ambulation on stairs with reciprocal gait with no difficulty  Patient to develop L knee  ROM and strength to allow for return to homekeeping  and yard work with no to little difficulty  Patient to develop L knee  ROM and strength to return to driving with no difficulty as appropriate   Patient to develop strength 4+/5 to 5/5 L knee to amb down grades with out difficulty      Plan  Patient would benefit from: skilled physical therapy  Planned modality interventions: cryotherapy and unattended electrical stimulation    Planned therapy interventions: manual therapy, neuromuscular re-education, patient education, functional ROM exercises, graded exercise, home exercise program, therapeutic exercise, therapeutic activities, strengthening and stretching    Frequency: 2x week  Duration in weeks: 4  Plan of Care beginning date: 6/20/2024  Plan of Care expiration date: 7/18/2024  Treatment plan discussed with: patient        Subjective Evaluation    History of Present Illness  Mechanism of injury: surgery  Mechanism of injury: Pt notes fall 2 years ago  and injury to R knee but had been having injections in B knees secondary to some pain.  He notes R knee tx with injection series but did not help. He notes worsening of pain and progressive decrease in weight bearing tolerances limited recreation, walking and standing. He noted mild swelling pre operatively and pain and limitations with bending knee  with painful crack or \"creaking\" with bending knee.  He  underwent arthroscopic debridement 5/2/24 by Dr. Mello  in [place]. He noted good tolerance to surgery.  He noted PMH of  prostrate and " urethra difficulty, denies any allergies. He notes medications of  81 asa, pain medication at night for knee, tyelnol arthritis, antiinflammatory meds name unknown.  Hx of Stomach surgery - laproscopy  years ago. .     Update 24- Pt returns following surgery to L knee  with dx of PLM with surgery on  24 with good tolerance.  He notes using CP and eleveation many times per day. He is not using an assistive device and has pain with amb.  He notes having  alimp secondary to the pain. Pt  notes some thigh muscle cramps last night and today. Meds  Tremadol, meloxicam, asa   since surgery in addition to regula rmeds reviewed with pt in chart,  notes no changes in medical dx or other medication, no change in allergies. Returns for follow 24.   Quality of life: good    Patient Goals  Patient goals for therapy: return to sport/leisure activities, decreased pain and decreased edema  Patient goal: to return to walking for hunting, to return to full weight bearing function, to amb down staira dn grades without diffiuclty  Pain  Current pain rating: 3  At best pain ratin  At worst pain ratin  Location: ant aspect of L knee  Quality: discomfort and sharp  Relieving factors: ice and medications  Aggravating factors: walking, standing and stair climbing  Progression: improved    Social Support  1  Lives in: multiple-level home  Lives with: spouse    Employment status: not working  Hand dominance: right          Objective     Observations   Left Knee   Positive for edema.     Additional Observation Details  Post op arthroscopy sites infrapatellar region L knee intact with steristrips with no redness or signs of infection present    Palpation     Right   No palpable tenderness to the distal biceps femoris, distal semimembranosus, distal semitendinosus, lateral gastrocnemius, medial gastrocnemius, rectus femoris, vastus lateralis and vastus medialis.     Neurological Testing     Sensation     Knee   Left Knee  "  Intact: Light touch    Right Knee   Intact: light touch     Active Range of Motion   Left Knee   Flexion: 120 degrees   Extension: 15 degrees     Right Knee   Flexion: 130 degrees   Extension: 5 degrees     Patellar Static Positioning   Left Knee: WFL  Right Knee: WFL    Strength/Myotome Testing     Left Knee   Flexion: 4  Extension: 4  Quadriceps contraction: good    Right Knee   Normal strength  Flexion: 4  Extension: 4  Quadriceps contraction: good    Additional Strength Details  L LE Ankle df  4+/5, pf 4+/5  Hip extension 4+/5, abduction 4+/5 RLE    Swelling     Left Knee Girth Measurement (cm)   Joint line: 38 cm  10 cm above joint line: 41 cm  10 cm below joint line: 34.7 cm    Right Knee Girth Measurement (cm)   Joint line: 36.7 cm  10 cm above joint line: 41.3 cm  10 cm below joint line: 35 cm    Ambulation     Observational Gait   Gait: antalgic   Decreased walking speed, stride length, left stance time and left step length.   Left foot contact pattern: foot flat    Quality of Movement During Gait   Trunk  Forward lean.     Knee    Knee (Left): Positive stiff knee.     General Comments:      Knee Comments  Demonstrates L knee swelling in suprapatellar pouch and superiolateral knee mild to mod in amount             Precautions:  arthroscopy chondro trochlea R knee, med/lat men tears      Daily Treatment Diary:      Initial Evaluation Date: 06/20/24  Compliance 5/6                     Visit Number 1                    Re-Eval  IE                 MC   Foto Captured Y                           5/6                     Manual                      PROM R knee -                     STM edema reduc -                     STM scars -                     Ther-Ex                      QS 10x5\"                     ASLR w/QS 10x                     Heel slides with strap for gentle stretch 10x5\"                     Self calf stretch with strap 3 x 30\"                     HR 15x                     Hip 3 ways st 15 x ea " "LLE                                                                                                             Neuro Re-Ed                      Mini squats -                     SLB 3 x 15\"            lunge -            Step up, lat step up -                         Ther-Act              HEP, pt educ tx plan, activity mods, elevation, self mgmt PC                                                Modalities                      CP 10 m L knee post tx                                                    Access Code: [email or web address]  URL: [email or web address]  Date: 05/06/2024  Prepared by: Raisa Lanza    Exercises  - Long Sitting Calf Stretch with Strap  - 2 x daily - 7 x weekly - 1 sets - 3 reps - 30 hold  - Long Sitting Quad Set  - 2 x daily - 7 x weekly - 2 sets - 10 reps  - Sitting Heel Slide with Towel  - 2 x daily - 7 x weekly - 1 sets - 10 reps - 5 hold  - Active Straight Leg Raise with Quad Set  - 2 x daily - 7 x weekly - 1 sets - 10 reps - 2 hold  - Seated Long Arc Quad  - 2 x daily - 7 x weekly - 1 sets - 10 reps - 3 hold  - Heel Raises with Counter Support  - 2 x daily - 7 x weekly - 1 sets - 10 reps - 2 hold  - Single Leg Stance with Support  - 2 x daily - 7 x weekly - 1 sets - 3 reps    Patient Education  - Leg Elevation for Swelling    "

## 2024-06-25 ENCOUNTER — OFFICE VISIT (OUTPATIENT)
Dept: PHYSICAL THERAPY | Facility: CLINIC | Age: 74
End: 2024-06-25
Payer: MEDICARE

## 2024-06-25 DIAGNOSIS — S83.242D TEAR OF MEDIAL MENISCUS OF LEFT KNEE, UNSPECIFIED TEAR TYPE, UNSPECIFIED WHETHER OLD OR CURRENT TEAR, SUBSEQUENT ENCOUNTER: Primary | ICD-10-CM

## 2024-06-25 DIAGNOSIS — Z98.890 S/P ARTHROSCOPY OF LEFT KNEE: ICD-10-CM

## 2024-06-25 PROCEDURE — 97110 THERAPEUTIC EXERCISES: CPT | Performed by: PHYSICAL THERAPIST

## 2024-06-25 NOTE — PROGRESS NOTES
"Daily Note     Today's date: 2024  Patient name: Damien Finch  : 1950  MRN: [id number]  Referring provider: Wiliam Mello MD  Dx:   Encounter Diagnosis     ICD-10-CM    1. Tear of medial meniscus of left knee, unspecified tear type, unspecified whether old or current tear, subsequent encounter  S83.242D       2. S/P arthroscopy of left knee  Z98.890                      Subjective: Pt notes he is walking better and has no pain sx. He noted improved ability amb on stairs with less difficulty. He notes compliance with HEP and use of CP.      Objective: See treatment diary below. Pt demonstrated 140 deg. Flexion activley L knee today. Steri strips intact with no signs of infection present. Decrease in swelling in L knee noted. Pt amb with intact gait with non antalgia demonstrated. Pt does note pain with active knee extesnion 20 deg. Infrapatellar ant knee.       Assessment: Tolerated treatment well. Patient would benefit from continued PT to achieve goals of care. Pt is progressing toward all goals of care.       Plan: Continue per plan of care.      Precautions:  arthroscopy chondro trochlea R knee, med/lat men tears      Daily Treatment Diary:      Initial Evaluation Date: 24  Compliance                    Visit Number 1 2                   Re-Eval  IE                 MC   Foto Captured Y                                              Manual                      PROM R knee -                     STM edema reduc -                     STM scars -                     Ther-Ex                      QS 10x5\"  10x10\", 2 sets                   ASLR w/QS 10x  2 x 10, 2\"                   Heel slides with strap for gentle stretch 10x5\" 20x5\"                   Self calf stretch with strap 3 x 30\"  3 x 30\"                   HR 15x  2 x 10 HR/TR B                   Hip 3 ways st 15 x ea LLE  15xea LLE                   St L knee flexion AROM   2 x 10                   SAQ 1/2 foam roll  " " 2 x 10, 3\"                                                               Neuro Re-Ed                      Mini squats -                     SLB 3 x 15\" 60 sec LLE floor           lunge -            Step up, lat step up -                         Ther-Act              HEP, pt educ tx plan, activity mods, elevation, self mgmt PC PC review                                              Modalities                      CP 10 m L knee post tx                                                    Access Code: [email or web address]  URL: [email or web address]  Date: 05/06/2024  Prepared by: Raisa Lanza    Exercises  - Long Sitting Calf Stretch with Strap  - 2 x daily - 7 x weekly - 1 sets - 3 reps - 30 hold  - Long Sitting Quad Set  - 2 x daily - 7 x weekly - 2 sets - 10 reps  - Sitting Heel Slide with Towel  - 2 x daily - 7 x weekly - 1 sets - 10 reps - 5 hold  - Active Straight Leg Raise with Quad Set  - 2 x daily - 7 x weekly - 1 sets - 10 reps - 2 hold  - Seated Long Arc Quad  - 2 x daily - 7 x weekly - 1 sets - 10 reps - 3 hold  - Heel Raises with Counter Support  - 2 x daily - 7 x weekly - 1 sets - 10 reps - 2 hold  - Single Leg Stance with Support  - 2 x daily - 7 x weekly - 1 sets - 3 reps    Patient Education  - Leg Elevation for Swelling      Access Code: [email or web address]  URL: [email or web address]  Date: 05/06/2024  Prepared by: Raisa Lanza    Exercises  - Long Sitting Calf Stretch with Strap  - 2 x daily - 7 x weekly - 1 sets - 3 reps - 30 hold  - Long Sitting Quad Set  - 2 x daily - 7 x weekly - 2 sets - 10 reps  - Sitting Heel Slide with Towel  - 2 x daily - 7 x weekly - 1 sets - 10 reps - 5 hold  - Active Straight Leg Raise with Quad Set  - 2 x daily - 7 x weekly - 1 sets - 10 reps - 2 hold  - Seated Long Arc Quad  - 2 x daily - 7 x weekly - 1 sets - 10 reps - 3 hold  - Heel Raises with Counter Support  - 2 x daily - 7 x weekly - 1 sets - 10 reps - 2 hold  - Single Leg Stance with Support  - 2 x daily - " 7 x weekly - 1 sets - 3 reps    Patient Education  - Leg Elevation for Swelling

## 2024-06-27 ENCOUNTER — OFFICE VISIT (OUTPATIENT)
Dept: PHYSICAL THERAPY | Facility: CLINIC | Age: 74
End: 2024-06-27
Payer: MEDICARE

## 2024-06-27 DIAGNOSIS — Z98.890 S/P ARTHROSCOPY OF LEFT KNEE: ICD-10-CM

## 2024-06-27 DIAGNOSIS — S83.242D TEAR OF MEDIAL MENISCUS OF LEFT KNEE, UNSPECIFIED TEAR TYPE, UNSPECIFIED WHETHER OLD OR CURRENT TEAR, SUBSEQUENT ENCOUNTER: Primary | ICD-10-CM

## 2024-06-27 PROCEDURE — 97112 NEUROMUSCULAR REEDUCATION: CPT | Performed by: PHYSICAL THERAPIST

## 2024-06-27 PROCEDURE — 97110 THERAPEUTIC EXERCISES: CPT | Performed by: PHYSICAL THERAPIST

## 2024-06-27 NOTE — PROGRESS NOTES
"Daily Note     Today's date: 2024  Patient name: Damien Finch  : 1950  MRN: [id number]  Referring provider: Wiliam Mello MD  Dx:   Encounter Diagnosis     ICD-10-CM    1. Tear of medial meniscus of left knee, unspecified tear type, unspecified whether old or current tear, subsequent encounter  S83.242D       2. S/P arthroscopy of left knee  Z98.890                      Subjective:  Pt notes L knee is doing well . Notes being active  with home-keeping and yard work yesterday.  He notes no pain sx but LE fatigue. He did not swell after activity.       Objective: See treatment diary below. Incisions site are without steri strips and appear healed with out signs of infection, no drainage present. Mild increase in scar tissue about incision sites. Pt L knee AROM  0 - 128 deg.     Assessment: Tolerated treatment well. Patient would benefit from continued PT to achieve goals of care.       Plan: Continue per plan of care.      Precautions:  arthroscopy chondro trochlea R knee, med/lat men tears      Daily Treatment Diary:      Initial Evaluation Date: 24  Compliance                  Visit Number 1 2  3                 Re-Eval  IE                 MC   Foto Captured Y                                            Manual                      PROM R knee -                     STM edema reduc -                     STM scars -                     Ther-Ex                      QS 10x5\"  10x10\", 2 sets 10x10\" 2 sets                 ASLR w/QS 10x  2 x 10, 2\"  2 x 10, 2\"                 Heel slides with strap for gentle stretch 10x5\" 20x5\"  20x5\"                 Self calf stretch with strap 3 x 30\"  3 x 30\"  3 x 30\"                 HR 15x  2 x 10 HR/TR B  2 x 10 TR/HR                 Hip 3 ways st 15 x ea LLE  15xea LLE  20x ea LLE, 2#                 St L knee flexion AROM   2 x 10  2 x 10, 2\"                 SAQ 1/2 foam roll   2 x 10, 3\"  2 x 10 foam roll 12 x 10, 3\"          " "       Leg press     65# 3 x 10 B                                       Neuro Re-Ed                      Mini squats -    not ankur                 SLB 3 x 15\" 60 sec LLE floor 60 sec fl  3 x 20\" air Ex          lunge -            Step up, lat step up -  2 x 10                        Ther-Act              HEP, pt educ tx plan, activity mods, elevation, self mgmt PC PC review  PC review HEP and activit mods                                            Modalities                      CP 10 m L knee post tx                                                    Access Code: [email or web address]  URL: [email or web address]  Date: 05/06/2024  Prepared by: Raisa Lanza    Exercises  - Long Sitting Calf Stretch with Strap  - 2 x daily - 7 x weekly - 1 sets - 3 reps - 30 hold  - Long Sitting Quad Set  - 2 x daily - 7 x weekly - 2 sets - 10 reps  - Sitting Heel Slide with Towel  - 2 x daily - 7 x weekly - 1 sets - 10 reps - 5 hold  - Active Straight Leg Raise with Quad Set  - 2 x daily - 7 x weekly - 1 sets - 10 reps - 2 hold  - Seated Long Arc Quad  - 2 x daily - 7 x weekly - 1 sets - 10 reps - 3 hold  - Heel Raises with Counter Support  - 2 x daily - 7 x weekly - 1 sets - 10 reps - 2 hold  - Single Leg Stance with Support  - 2 x daily - 7 x weekly - 1 sets - 3 reps    Patient Education  - Leg Elevation for Swelling      Access Code: [email or web address]  URL: [email or web address]  Date: 05/06/2024  Prepared by: Raisa Lanza    Exercises  - Long Sitting Calf Stretch with Strap  - 2 x daily - 7 x weekly - 1 sets - 3 reps - 30 hold  - Long Sitting Quad Set  - 2 x daily - 7 x weekly - 2 sets - 10 reps  - Sitting Heel Slide with Towel  - 2 x daily - 7 x weekly - 1 sets - 10 reps - 5 hold  - Active Straight Leg Raise with Quad Set  - 2 x daily - 7 x weekly - 1 sets - 10 reps - 2 hold  - Seated Long Arc Quad  - 2 x daily - 7 x weekly - 1 sets - 10 reps - 3 hold  - Heel Raises with Counter Support  - 2 x daily - 7 x weekly - 1 sets - " 10 reps - 2 hold  - Single Leg Stance with Support  - 2 x daily - 7 x weekly - 1 sets - 3 reps    Patient Education  - Leg Elevation for Swelling

## 2024-07-01 ENCOUNTER — APPOINTMENT (OUTPATIENT)
Dept: PHYSICAL THERAPY | Facility: CLINIC | Age: 74
End: 2024-07-01
Payer: MEDICARE

## 2024-07-02 ENCOUNTER — OFFICE VISIT (OUTPATIENT)
Dept: PHYSICAL THERAPY | Facility: CLINIC | Age: 74
End: 2024-07-02
Payer: MEDICARE

## 2024-07-02 DIAGNOSIS — S83.242D TEAR OF MEDIAL MENISCUS OF LEFT KNEE, UNSPECIFIED TEAR TYPE, UNSPECIFIED WHETHER OLD OR CURRENT TEAR, SUBSEQUENT ENCOUNTER: Primary | ICD-10-CM

## 2024-07-02 DIAGNOSIS — Z98.890 S/P ARTHROSCOPY OF LEFT KNEE: ICD-10-CM

## 2024-07-02 PROCEDURE — 97110 THERAPEUTIC EXERCISES: CPT | Performed by: PHYSICAL THERAPIST

## 2024-07-02 PROCEDURE — 97112 NEUROMUSCULAR REEDUCATION: CPT | Performed by: PHYSICAL THERAPIST

## 2024-07-02 NOTE — PROGRESS NOTES
"Daily Note     Today's date: 2024  Patient name: Damien Finch  : 1950  MRN: [id number]  Referring provider: Wiliam Mello MD  Dx:   Encounter Diagnosis     ICD-10-CM    1. Tear of medial meniscus of left knee, unspecified tear type, unspecified whether old or current tear, subsequent encounter  S83.242D       2. S/P arthroscopy of left knee  Z98.890                      Subjective: Pt returned for care for left knee as per orders of Dr. Mello at check up to finish course of care.  Pt notes mild pain in ant knee this am but admits to being on feet a lot today. Notes improvement in walking down grade B knees.      Objective: See treatment diary below      Assessment: Tolerated treatment well. Patient would benefit from continued PT to achieve goals of care.       Plan: Continue per plan of care.      Precautions:  arthroscopy chondro trochlea R knee, med/lat men tears      Daily Treatment Diary:      Initial Evaluation Date: 24  Compliance                Visit Number 1 2  3  4               Re-Eval  IE                 MC   Foto Captured Y                                          Manual                      PROM R knee -                     STM edema reduc -                     STM scars -                     Ther-Ex                      QS 10x5\"  10x10\", 2 sets 10x10\" 2 sets -               ASLR w/QS 10x  2 x 10, 2\"  2 x 10, 2\"  2x10, 2# ankle               Heel slides with strap for gentle stretch 10x5\" 20x5\"  20x5\"  20x5\"               Self calf stretch with strap 3 x 30\"  3 x 30\"  3 x 30\"  3x30\"               HR 15x  2 x 10 HR/TR B  2 x 10 TR/HR  2x10 TR/HR LLE               Hip 3 ways st 15 x ea LLE  15xea LLE  20x ea LLE, 2# -               St L knee flexion AROM   2 x 10  2 x 10, 2\"  2x10 3#               SAQ 1/2 foam roll   2 x 10, 3\"  2 x 10 foam roll 12 x 10, 3\"  tke 2# 2 x 10, 3\"               Leg press     65# 3 x 10 B  75# 3 x 10 B         " "                            Neuro Re-Ed                      Mini squats -    not ankur  10x               SLB 3 x 15\" 60 sec LLE floor 60 sec fl  3 x 20\" air Ex Floor 60sec         lunge -            Step up, lat step up -  2 x 10           Static stabs     In pf, on airex 2 x 20 LLE only F/B         Ther-Act              HEP, pt educ tx plan, activity mods, elevation, self mgmt PC PC review  PC review HEP and activit mods                                            Modalities                      CP 10 m L knee post tx                                                    Access Code: [email or web address]  URL: [email or web address]  Date: 05/06/2024  Prepared by: Raisa Lanza    Exercises  - Long Sitting Calf Stretch with Strap  - 2 x daily - 7 x weekly - 1 sets - 3 reps - 30 hold  - Long Sitting Quad Set  - 2 x daily - 7 x weekly - 2 sets - 10 reps  - Sitting Heel Slide with Towel  - 2 x daily - 7 x weekly - 1 sets - 10 reps - 5 hold  - Active Straight Leg Raise with Quad Set  - 2 x daily - 7 x weekly - 1 sets - 10 reps - 2 hold  - Seated Long Arc Quad  - 2 x daily - 7 x weekly - 1 sets - 10 reps - 3 hold  - Heel Raises with Counter Support  - 2 x daily - 7 x weekly - 1 sets - 10 reps - 2 hold  - Single Leg Stance with Support  - 2 x daily - 7 x weekly - 1 sets - 3 reps    Patient Education  - Leg Elevation for Swelling      Access Code: [email or web address]  URL: [email or web address]  Date: 05/06/2024  Prepared by: Raisa Lanza    Exercises  - Long Sitting Calf Stretch with Strap  - 2 x daily - 7 x weekly - 1 sets - 3 reps - 30 hold  - Long Sitting Quad Set  - 2 x daily - 7 x weekly - 2 sets - 10 reps  - Sitting Heel Slide with Towel  - 2 x daily - 7 x weekly - 1 sets - 10 reps - 5 hold  - Active Straight Leg Raise with Quad Set  - 2 x daily - 7 x weekly - 1 sets - 10 reps - 2 hold  - Seated Long Arc Quad  - 2 x daily - 7 x weekly - 1 sets - 10 reps - 3 hold  - Heel Raises with Counter Support  - 2 x daily - 7 x " weekly - 1 sets - 10 reps - 2 hold  - Single Leg Stance with Support  - 2 x daily - 7 x weekly - 1 sets - 3 reps    Patient Education  - Leg Elevation for Swelling

## 2024-07-03 ENCOUNTER — APPOINTMENT (OUTPATIENT)
Dept: PHYSICAL THERAPY | Facility: CLINIC | Age: 74
End: 2024-07-03
Payer: MEDICARE

## 2024-07-08 ENCOUNTER — OFFICE VISIT (OUTPATIENT)
Dept: PHYSICAL THERAPY | Facility: CLINIC | Age: 74
End: 2024-07-08
Payer: MEDICARE

## 2024-07-08 DIAGNOSIS — S83.242D TEAR OF MEDIAL MENISCUS OF LEFT KNEE, UNSPECIFIED TEAR TYPE, UNSPECIFIED WHETHER OLD OR CURRENT TEAR, SUBSEQUENT ENCOUNTER: Primary | ICD-10-CM

## 2024-07-08 DIAGNOSIS — Z98.890 S/P ARTHROSCOPY OF LEFT KNEE: ICD-10-CM

## 2024-07-08 PROCEDURE — 97110 THERAPEUTIC EXERCISES: CPT | Performed by: PHYSICAL THERAPIST

## 2024-07-08 PROCEDURE — 97112 NEUROMUSCULAR REEDUCATION: CPT | Performed by: PHYSICAL THERAPIST

## 2024-07-08 NOTE — PROGRESS NOTES
"Daily Note/Discharge    Today's date: 2024  Patient name: Damien Finch  : 1950  MRN: [id number]  Referring provider: Wiliam Mello MD  Dx:   Encounter Diagnosis     ICD-10-CM    1. Tear of medial meniscus of left knee, unspecified tear type, unspecified whether old or current tear, subsequent encounter  S83.242D       2. S/P arthroscopy of left knee  Z98.890                      Subjective: Pt notes his \"knees both feel great\" and no longer has functional difficulty with current self care, yard work and amb on declines or unevens in yard. He does wish to d/c to HEP at this time.       Objective: See treatment diary below. L knee AROM 0-140, PROM 142 deg. No pain or burning sx , pt denies pain or burning. L knee strength ext and flexion 4+/5 with out pain sx.  Pt. SLB  LLE 60 seconds n Air Ex, without pain sx. Amb with out antalgic gait pattern demonstrated. Circumferential measure  not completed but only visible swelling at site of incisions noted. Mild scar noted with palpation at incision sites. Pt educated in self stm to scars with good ability to reproduce for HEP.  Final HEP and self managements, gradual return to activity and monitoring for swelling and pain sx education completed.       Assessment: Tolerated treatment well. Patient  tolerated care well today and is at appropriate levels to d/c to HEP at this time  with goals of care achieved.  He notes no follow up appointment with Dr. Mello unless prn.       Plan: Continue per plan of care.       Precautions:  arthroscopy chondro trochlea R knee, med/lat men tears      Daily Treatment Diary:      Initial Evaluation Date: 24  Compliance              Visit Number 1 2  3  4  5             Re-Eval  IE                 MC   Foto Captured Y       foto                                 Manual                      PROM R knee -                     STM edema reduc -                     STM " "scars -                     Ther-Ex                      QS 10x5\"  10x10\", 2 sets 10x10\" 2 sets -  10x10\" 2 sets             ASLR w/QS 10x  2 x 10, 2\"  2 x 10, 2\"  2x10, 2# ankle  2x15, 2# at ankle             Heel slides with strap for gentle stretch 10x5\" 20x5\"  20x5\"  20x5\"  heel slides A 20x5\"             Self calf stretch with strap 3 x 30\"  3 x 30\"  3 x 30\"  3x30\"  3 x 30\"             HR 15x  2 x 10 HR/TR B  2 x 10 TR/HR  2x10 TR/HR LLE  2x15 TR/HR LLE             Hip 3 ways st 15 x ea LLE  15xea LLE  20x ea LLE, 2# -  -             St L knee flexion AROM   2 x 10  2 x 10, 2\"  2x10 3#  2x10, 4# ankle             SAQ 1/2 foam roll   2 x 10, 3\"  2 x 10 foam roll 12 x 10, 3\"  tke 2# 2 x 10, 3\"  tke 2# 2 x 10, 3\"             Leg press     65# 3 x 10 B  75# 3 x 10 B  85# 3 x 10 B                                   Neuro Re-Ed                      Mini squats -    not ankur  10x  3 x 10             SLB 3 x 15\" 60 sec LLE floor 60 sec fl  3 x 20\" air Ex Floor 60sec Floor 60 sec, Air Ex 60 sec x2 ea        lunge -            Step up, lat step up -  2 x 10   -        Static stabs     In pf, on airex 2 x 20 LLE only F/B In pf on 1/2 foam roll 30x UE Tband pulls F/B        Ther-Act              HEP, pt educ tx plan, activity mods, elevation, self mgmt PC PC review  PC review HEP and activit mods    final HEP and self mgmts, act mods                                        Modalities                      CP 10 m L knee post tx    -                                                Access Code: [email or web address]  URL: [email or web address]  Date: 05/06/2024  Prepared by: Raisa Lanza    Exercises  - Long Sitting Calf Stretch with Strap  - 2 x daily - 7 x weekly - 1 sets - 3 reps - 30 hold  - Long Sitting Quad Set  - 2 x daily - 7 x weekly - 2 sets - 10 reps  - Sitting Heel Slide with Towel  - 2 x daily - 7 x weekly - 1 sets - 10 reps - 5 hold  - Active Straight Leg Raise with Quad Set  - 2 x daily - 7 x weekly - 1 sets - 10 " reps - 2 hold  - Seated Long Arc Quad  - 2 x daily - 7 x weekly - 1 sets - 10 reps - 3 hold  - Heel Raises with Counter Support  - 2 x daily - 7 x weekly - 1 sets - 10 reps - 2 hold  - Single Leg Stance with Support  - 2 x daily - 7 x weekly - 1 sets - 3 reps    Patient Education  - Leg Elevation for Swelling      Access Code: [email or web address]  URL: [email or web address]  Date: 05/06/2024  Prepared by: Raisa Lanza    Exercises  - Long Sitting Calf Stretch with Strap  - 2 x daily - 7 x weekly - 1 sets - 3 reps - 30 hold  - Long Sitting Quad Set  - 2 x daily - 7 x weekly - 2 sets - 10 reps  - Sitting Heel Slide with Towel  - 2 x daily - 7 x weekly - 1 sets - 10 reps - 5 hold  - Active Straight Leg Raise with Quad Set  - 2 x daily - 7 x weekly - 1 sets - 10 reps - 2 hold  - Seated Long Arc Quad  - 2 x daily - 7 x weekly - 1 sets - 10 reps - 3 hold  - Heel Raises with Counter Support  - 2 x daily - 7 x weekly - 1 sets - 10 reps - 2 hold  - Single Leg Stance with Support  - 2 x daily - 7 x weekly - 1 sets - 3 reps    Patient Education  - Leg Elevation for Swelling

## 2024-07-11 ENCOUNTER — APPOINTMENT (OUTPATIENT)
Dept: PHYSICAL THERAPY | Facility: CLINIC | Age: 74
End: 2024-07-11
Payer: MEDICARE

## 2024-07-15 ENCOUNTER — APPOINTMENT (OUTPATIENT)
Dept: PHYSICAL THERAPY | Facility: CLINIC | Age: 74
End: 2024-07-15
Payer: MEDICARE

## 2024-07-17 ENCOUNTER — APPOINTMENT (OUTPATIENT)
Dept: PHYSICAL THERAPY | Facility: CLINIC | Age: 74
End: 2024-07-17
Payer: MEDICARE

## 2024-09-19 ENCOUNTER — APPOINTMENT (OUTPATIENT)
Dept: LAB | Facility: CLINIC | Age: 74
End: 2024-09-19
Payer: MEDICARE

## 2024-09-19 DIAGNOSIS — R35.1 BENIGN PROSTATIC HYPERPLASIA WITH NOCTURIA: ICD-10-CM

## 2024-09-19 DIAGNOSIS — N40.1 BENIGN PROSTATIC HYPERPLASIA WITH NOCTURIA: ICD-10-CM

## 2024-09-19 LAB — PSA SERPL-MCNC: 0.32 NG/ML (ref 0–4)

## 2024-09-19 PROCEDURE — 36415 COLL VENOUS BLD VENIPUNCTURE: CPT

## 2024-09-19 PROCEDURE — 84153 ASSAY OF PSA TOTAL: CPT

## 2024-10-02 ENCOUNTER — OFFICE VISIT (OUTPATIENT)
Dept: UROLOGY | Facility: CLINIC | Age: 74
End: 2024-10-02
Payer: MEDICARE

## 2024-10-02 VITALS
BODY MASS INDEX: 23.08 KG/M2 | WEIGHT: 170.4 LBS | SYSTOLIC BLOOD PRESSURE: 126 MMHG | OXYGEN SATURATION: 100 % | TEMPERATURE: 97.6 F | DIASTOLIC BLOOD PRESSURE: 64 MMHG | HEIGHT: 72 IN | HEART RATE: 51 BPM

## 2024-10-02 DIAGNOSIS — R35.1 BENIGN PROSTATIC HYPERPLASIA WITH NOCTURIA: Primary | ICD-10-CM

## 2024-10-02 DIAGNOSIS — N40.1 BENIGN PROSTATIC HYPERPLASIA WITH NOCTURIA: Primary | ICD-10-CM

## 2024-10-02 PROCEDURE — 99213 OFFICE O/P EST LOW 20 MIN: CPT | Performed by: UROLOGY

## 2024-10-02 NOTE — PROGRESS NOTES
UROLOGY PROGRESS NOTE         NAME: Damien Finch  AGE: 74 y.o. SEX: male  : 1950   MRN: [id number]    DATE: 10/2/2024  TIME: 11:08 AM    Assessment and Plan      Impression:   1. Benign prostatic hyperplasia with nocturia  -     PSA Total, Diagnostic; Future; Expected date: 10/01/2025  Patient is doing well from urologic standpoint.  He does have some mild irritative symptoms but he is happy with the situation.  The Cialis does help him with his erectile dysfunction.  Although it is not consistent.  We talked about how to optimize this.     Plan: He will try and use the Cialis 20 mg at least an hour prior to sex on an empty stomach.  Follow-up 1 year with a PSA.      Chief Complaint     Chief Complaint   Patient presents with    Follow-up     History of Present Illness     HPI: Damien Finch is a 74 y.o. year old male who presents with status post UroLift procedure just over a year ago.  Patient has nocturia typically once a night.  Sometimes more.  Some frequency and urgency during the day but way better than it was prior to the UroLift procedure.  He is off the Flomax.  No incontinence.  He does have erectile dysfunction and he does remain physically active.  He uses Cialis 20 mg as needed.  It does not of his work but most of the time it does.  We talked about optimizing it by taking it at least an hour prior to sex on an empty stomach.  PSA recently low 0.4              The following portions of the patient's history were reviewed and updated as appropriate: allergies, current medications, past family history, past medical history, past social history, past surgical history and problem list.  Past Medical History:   Diagnosis Date    Arthritis     bilateral knees; pt is getting knee injections    BPH (benign prostatic hyperplasia)     Frequency of urination     Kidney stone     Nocturia      Past Surgical History:   Procedure Laterality Date    APPENDECTOMY      COLONOSCOPY      EGD       HERNIA REPAIR      KNEE CARTILAGE SURGERY Left     NASAL SEPTUM SURGERY      PARAESOPHAGEAL HERNIA REPAIR      AR CYSTO INSERTION TRANSPROSTATIC IMPLANT SINGLE N/A 6/5/2023    Procedure: CYSTOSCOPY WITH INSERTION UROLIFT;  Surgeon: Frank D'Amico, MD;  Location: [facility];  Service: Urology    UVULECTOMY       shoulder  Review of Systems     Const: Denies chills, fever and weight loss.  CV: Denies chest pain.  Resp: Denies SOB.  GI: Denies abdominal pain, nausea and vomiting.  : Denies symptoms other than stated above.  Musculo: Denies back pain.    Objective   /64   Pulse (!) 51   Temp 97.6 °F (36.4 °C)   Ht 6' (1.829 m)   Wt 77.3 kg (170 lb 6.4 oz)   SpO2 100%   BMI 23.11 kg/m²     Physical Exam  Const: Appears healthy and well developed. No signs of acute distress present.  Resp: Respirations are regular and unlabored.   CV: Rate is regular. Rhythm is regular.  Abdomen: Abdomen is soft, nontender, and nondistended. Kidneys are not palpable.  : Penis testicles epididymis normal.  Prostate 1+.  No hernias.  No SP tenderness.  Psych: Patient's attitude is cooperative. Mood is normal. Affect is normal.    Current Medications     Current Outpatient Medications:     Diclofenac Sodium (VOLTAREN) 1 %, Apply 2 g topically if needed, Disp: , Rfl:     multivitamin (THERAGRAN) TABS, Take 1 tablet by mouth daily, Disp: , Rfl:     tadalafil (CIALIS) 20 MG tablet, Take 20 mg by mouth as needed, Disp: , Rfl:     Aspirin Buf,CaCarb-MgCarb-MgO, 81 MG TABS, Take by mouth daily (Patient not taking: Reported on 5/30/2023), Disp: , Rfl:     naproxen (Naprosyn) 500 mg tablet, Take 1 tablet (500 mg total) by mouth 2 (two) times a day with meals (Patient not taking: Reported on 12/12/2022), Disp: 30 tablet, Rfl: 0    Omega-3 Fatty Acids (Fish Oil) 1000 MG CPDR, Take by mouth (Patient not taking: Reported on 3/13/2023), Disp: , Rfl:     ondansetron (ZOFRAN) 4 mg tablet, Take 1 tablet (4 mg total) by mouth every 6 (six)  hours (Patient not taking: Reported on 11/15/2021), Disp: 12 tablet, Rfl: 0    oxyCODONE-acetaminophen (PERCOCET) 5-325 mg per tablet, Take 1 tablet by mouth every 4 (four) hours as needed for moderate pain for up to 10 doses Max Daily Amount: 6 tablets (Patient not taking: Reported on 11/15/2021), Disp: 10 tablet, Rfl: 0    oxyCODONE-acetaminophen (PERCOCET) 5-325 mg per tablet, Take 1-2 tablets by mouth every 6 (six) hours as needed for moderate pain or severe pain Max Daily Amount: 8 tablets (Patient not taking: Reported on 6/19/2023), Disp: 20 tablet, Rfl: 0    tamsulosin (FLOMAX) 0.4 mg, Take 1 capsule (0.4 mg total) by mouth daily with dinner for 10 days (Patient not taking: Reported on 3/27/2023), Disp: 30 capsule, Rfl: 3        Frank D'Amico, MD

## 2024-12-27 ENCOUNTER — OFFICE VISIT (OUTPATIENT)
Dept: URGENT CARE | Facility: CLINIC | Age: 74
End: 2024-12-27
Payer: MEDICARE

## 2024-12-27 VITALS
RESPIRATION RATE: 16 BRPM | HEART RATE: 70 BPM | OXYGEN SATURATION: 98 % | SYSTOLIC BLOOD PRESSURE: 146 MMHG | TEMPERATURE: 98 F | DIASTOLIC BLOOD PRESSURE: 80 MMHG | WEIGHT: 175 LBS | BODY MASS INDEX: 23.7 KG/M2 | HEIGHT: 72 IN

## 2024-12-27 DIAGNOSIS — J06.9 VIRAL URI: Primary | ICD-10-CM

## 2024-12-27 DIAGNOSIS — J02.9 SORE THROAT: ICD-10-CM

## 2024-12-27 LAB — S PYO AG THROAT QL: NEGATIVE

## 2024-12-27 PROCEDURE — 99213 OFFICE O/P EST LOW 20 MIN: CPT

## 2024-12-27 PROCEDURE — G0463 HOSPITAL OUTPT CLINIC VISIT: HCPCS

## 2024-12-27 PROCEDURE — 87070 CULTURE OTHR SPECIMN AEROBIC: CPT

## 2024-12-27 PROCEDURE — 87880 STREP A ASSAY W/OPTIC: CPT

## 2024-12-27 NOTE — PROGRESS NOTES
[facility]        NAME: Damien Finch is a 74 y.o. male  : 1950    MRN: [id number]  DATE: 2024  TIME: 12:01 PM    Assessment and Plan   Viral URI [J06.9]  1. Viral URI        2. Sore throat  POCT rapid strepA    Throat culture    Throat culture        Rapid strep: neg    Patient Instructions     Rapid strep test completed and negative. Will send for culture and call patient if positive. Infection appears viral. Recommend symptomatic treatment. Can take ibuprofen or tylenol as needed for pain or fever. Over the counter cough and cold medications to help with symptoms. Use salt water gargles for sore throat and throat lozenges. Cough drops as needed. Wash hands frequently to prevent the spread of infection. If not improving over the next 7-10 days, follow up with PCP. Symptoms may persist for 10-14 days.  To present to the ER if symptoms worsen.     If tests are performed, our office will contact you with results only if changes need to made to the care plan discussed with you at the visit. You can [facility] full [facility].    Chief Complaint     Chief Complaint   Patient presents with    Sore Throat     Sore throat x 1 day.          History of Present Illness       Sore Throat   This is a new problem. The current episode started yesterday. There has been no fever. Associated symptoms include congestion. Pertinent negatives include no coughing, ear pain, headaches or shortness of breath. Treatments tried: mucinex, sudafed, tylenol.       Review of Systems   Review of Systems   Constitutional:  Negative for chills, fatigue and fever.   HENT:  Positive for congestion, rhinorrhea and sore throat. Negative for ear pain, postnasal drip, sinus pressure, sneezing and tinnitus.    Eyes:  Negative for photophobia, redness and itching.   Respiratory:  Negative for cough, chest tightness, shortness of breath and wheezing.    Cardiovascular:  Negative for chest pain.    Skin:  Negative for color change and pallor.   Neurological:  Negative for dizziness, light-headedness and headaches.   Psychiatric/Behavioral:  Negative for confusion.          Current Medications       Current Outpatient Medications:     multivitamin (THERAGRAN) TABS, Take 1 tablet by mouth daily, Disp: , Rfl:     tadalafil (CIALIS) 20 MG tablet, Take 20 mg by mouth as needed, Disp: , Rfl:     Current Allergies     Allergies as of 12/27/2024    (No Known Allergies)            The following portions of the patient's history were reviewed and updated as appropriate: allergies, current medications, past family history, past medical history, past social history, past surgical history and problem list.     Past Medical History:   Diagnosis Date    Arthritis     bilateral knees; pt is getting knee injections    BPH (benign prostatic hyperplasia)     Frequency of urination     Kidney stone     Nocturia        Past Surgical History:   Procedure Laterality Date    APPENDECTOMY      COLONOSCOPY      EGD      HERNIA REPAIR      KNEE CARTILAGE SURGERY Left     NASAL SEPTUM SURGERY      PARAESOPHAGEAL HERNIA REPAIR      [place] CYSTO INSERTION TRANSPROSTATIC IMPLANT SINGLE N/A 6/5/2023    Procedure: CYSTOSCOPY WITH INSERTION UROLIFT;  Surgeon: Frank D'Amico, MD;  Location:  MAIN OR;  Service: Urology    UVULECTOMY         Family History   Problem Relation Age of Onset    Heart disease Mother     COPD Father          Medications have been verified.        Objective   /80   Pulse 70   Temp 98 °F (36.7 °C)   Resp 16   Ht 6' (1.829 m)   Wt 79.4 kg (175 lb)   SpO2 98%   BMI 23.73 kg/m²        Physical Exam     Physical Exam  Constitutional:       Appearance: Normal appearance.   HENT:      Head: Normocephalic.      Right Ear: Tympanic membrane and external ear normal.      Left Ear: Tympanic membrane and external ear normal.      Mouth/Throat:      Mouth: Mucous membranes are moist.      Pharynx: Oropharynx is clear.  Posterior oropharyngeal erythema present. No oropharyngeal exudate.   Eyes:      Extraocular Movements: Extraocular movements intact.      Conjunctiva/sclera: Conjunctivae normal.      Pupils: Pupils are equal, round, and reactive to light.   Cardiovascular:      Rate and Rhythm: Normal rate and regular rhythm.      Pulses: Normal pulses.      Heart sounds: Normal heart sounds.   Pulmonary:      Effort: Pulmonary effort is normal. No respiratory distress.      Breath sounds: Normal breath sounds. No stridor. No wheezing, rhonchi or rales.   Musculoskeletal:      Cervical back: No tenderness.   Lymphadenopathy:      Cervical: No cervical adenopathy.   Skin:     General: Skin is warm and dry.   Neurological:      General: No focal deficit present.      Mental Status: He is alert and oriented to person, place, and time. Mental status is at baseline.   Psychiatric:         Mood and Affect: Mood normal.         Behavior: Behavior normal.         Thought Content: Thought content normal.         Judgment: Judgment normal.

## 2024-12-29 LAB — BACTERIA THROAT CULT: NORMAL

## 2025-01-16 ENCOUNTER — APPOINTMENT (OUTPATIENT)
Dept: LAB | Facility: CLINIC | Age: 75
End: 2025-01-16
Payer: MEDICARE

## 2025-01-16 DIAGNOSIS — R00.1 SEVERE SINUS BRADYCARDIA: ICD-10-CM

## 2025-01-16 DIAGNOSIS — R53.83 FATIGUE, UNSPECIFIED TYPE: ICD-10-CM

## 2025-01-16 DIAGNOSIS — E55.9 VITAMIN D DEFICIENCY: ICD-10-CM

## 2025-01-16 LAB
25(OH)D3 SERPL-MCNC: 30.6 NG/ML (ref 30–100)
FOLATE SERPL-MCNC: 21.3 NG/ML
TSH SERPL DL<=0.05 MIU/L-ACNC: 2.28 UIU/ML (ref 0.45–4.5)
VIT B12 SERPL-MCNC: 498 PG/ML (ref 180–914)

## 2025-01-16 PROCEDURE — 84443 ASSAY THYROID STIM HORMONE: CPT

## 2025-01-16 PROCEDURE — 36415 COLL VENOUS BLD VENIPUNCTURE: CPT

## 2025-01-16 PROCEDURE — 82607 VITAMIN B-12: CPT

## 2025-01-16 PROCEDURE — 82306 VITAMIN D 25 HYDROXY: CPT

## 2025-01-16 PROCEDURE — 82746 ASSAY OF FOLIC ACID SERUM: CPT

## 2025-03-26 ENCOUNTER — TELEPHONE (OUTPATIENT)
Age: 75
End: 2025-03-26

## 2025-03-26 NOTE — TELEPHONE ENCOUNTER
Patient calling to schedule his yearly follow up with PSA with Dr. D'amico in October.    Pt scheduled on 10/3 at 9 am and informed to get PSA done prior to this visit.

## (undated) DEVICE — GLOVE SRG BIOGEL 6.5

## (undated) DEVICE — STERILE SURGICAL LUBRICANT,  TUBE: Brand: SURGILUBE

## (undated) DEVICE — SINGLE PORT MANIFOLD: Brand: NEPTUNE 2

## (undated) DEVICE — CATH FOLEY COUDE 18FR 5ML 2 WAY CARSON LUBRICATH

## (undated) DEVICE — MAT FLOOR STEP DRI 24 X 36 IN N-STRL

## (undated) DEVICE — PACK TUR

## (undated) DEVICE — BAG URINE DRAINAGE LEG

## (undated) DEVICE — SCD SEQUENTIAL COMPRESSION COMFORT SLEEVE MEDIUM KNEE LENGTH: Brand: KENDALL SCD